# Patient Record
Sex: FEMALE | Race: BLACK OR AFRICAN AMERICAN | NOT HISPANIC OR LATINO | Employment: OTHER | URBAN - METROPOLITAN AREA
[De-identification: names, ages, dates, MRNs, and addresses within clinical notes are randomized per-mention and may not be internally consistent; named-entity substitution may affect disease eponyms.]

---

## 2023-10-17 ENCOUNTER — TELEPHONE (OUTPATIENT)
Dept: OPHTHALMOLOGY | Facility: CLINIC | Age: 73
End: 2023-10-17
Payer: MEDICARE

## 2023-10-17 ENCOUNTER — TRANSCRIBE ORDERS (OUTPATIENT)
Dept: OTHER | Age: 73
End: 2023-10-17

## 2023-10-17 ENCOUNTER — TELEPHONE (OUTPATIENT)
Dept: OPHTHALMOLOGY | Facility: CLINIC | Age: 73
End: 2023-10-17

## 2023-10-17 DIAGNOSIS — D35.2 PITUITARY ADENOMA (H): Primary | ICD-10-CM

## 2023-10-17 NOTE — TELEPHONE ENCOUNTER
Ok per protocols for neuro-optometry/Dr. Andrews in lieu of any acute vision change.    Note to patient communicator to assist in scheduling    Roby Cheng RN 10:21 AM 10/17/23

## 2023-10-17 NOTE — TELEPHONE ENCOUNTER
[facility] Call Center    Phone Message    May a detailed message be left on voicemail: yes     Reason for Call: Appointment Intake    Referring Provider Name: Referral from Myrna Peralta NP [facility]   Diagnosis and/or Symptoms: Pituitary adenoma.  GL's state to send a high priority TE.  Please review.    Action Taken: Message routed to:  [facility]    Travel Screening: Not Applicable

## 2023-10-17 NOTE — TELEPHONE ENCOUNTER
Called and spoke to Alicia     Made her an appointment for 11/6 @ 8 am with Dr. Andrews     Discussed     Wait time     Added appointment to the wait list       Declined to call her insurance - discussed billing / insurance - hospital based - pt stated we are the only clinic that has an issue with her insurance and her insurance pays for everything    I tried to explain the billing / insurance - but was not able to explain     :    Saima Price Communication Facilitator on 10/17/2023 at 11:07 AM

## 2023-10-27 ENCOUNTER — HOSPITAL ENCOUNTER (OUTPATIENT)
Dept: MRI IMAGING | Facility: CLINIC | Age: 73
Discharge: HOME | End: 2023-10-27
Admitting: NURSE PRACTITIONER
Payer: MEDICARE

## 2023-10-27 DIAGNOSIS — D35.2 PITUITARY ADENOMA (H): ICD-10-CM

## 2023-10-27 PROCEDURE — A9585 GADOBUTROL INJECTION: HCPCS

## 2023-10-27 PROCEDURE — 255N000002 HC RX 255 OP 636

## 2023-10-27 PROCEDURE — 70553 MRI BRAIN STEM W/O & W/DYE: CPT

## 2023-10-27 RX ORDER — GADOBUTROL 604.72 MG/ML
8 INJECTION INTRAVENOUS ONCE
Status: COMPLETED | OUTPATIENT
Start: 2023-10-27 | End: 2023-10-27

## 2023-10-27 RX ADMIN — GADOBUTROL 8 ML: 604.72 INJECTION INTRAVENOUS at 08:10

## 2023-10-31 ENCOUNTER — TELEPHONE (OUTPATIENT)
Dept: OPHTHALMOLOGY | Facility: CLINIC | Age: 73
End: 2023-10-31

## 2023-10-31 NOTE — TELEPHONE ENCOUNTER
[facility] Call Center    Phone Message    May a detailed message be left on voicemail: yes     Reason for Call: Appointment Intake    Referring Provider Name: Myrna Peralta NP   Diagnosis and/or Symptoms: Pituitary adenoma. PT had an appt for today 10/31/2023 @2pm but had to cancel due to weather and wasn't going to make the appt on time but is calling back to want to be seen today if still availability. Please review and contact pt to discuss. Thank you     Action Taken: Message routed to:  [facility]    Travel Screening: Not Applicable

## 2023-10-31 NOTE — TELEPHONE ENCOUNTER
Called and r/s appointment for Dec     Pt canceled her appointment twice with Dr. Andrews - scheduled for next available - added appointment to the wait list     Saima Price Communication Facilitator on 10/31/2023 at 4:24 PM

## 2023-11-07 ENCOUNTER — OFFICE VISIT (OUTPATIENT)
Dept: OPHTHALMOLOGY | Facility: CLINIC | Age: 73
End: 2023-11-07
Attending: OPHTHALMOLOGY
Payer: MEDICARE

## 2023-11-07 DIAGNOSIS — D35.2 PITUITARY ADENOMA (H): Primary | ICD-10-CM

## 2023-11-07 DIAGNOSIS — H91.92 DECREASED HEARING OF LEFT EAR: ICD-10-CM

## 2023-11-07 PROBLEM — N39.41 URGE INCONTINENCE OF URINE: Status: ACTIVE | Noted: 2023-11-07

## 2023-11-07 PROBLEM — Z85.41 HISTORY OF MALIGNANT NEOPLASM OF CERVIX: Status: ACTIVE | Noted: 2023-11-07

## 2023-11-07 PROCEDURE — 92083 EXTENDED VISUAL FIELD XM: CPT

## 2023-11-07 PROCEDURE — 92133 CPTRZD OPH DX IMG PST SGM ON: CPT

## 2023-11-07 PROCEDURE — G0463 HOSPITAL OUTPT CLINIC VISIT: HCPCS

## 2023-11-07 PROCEDURE — 99204 OFFICE O/P NEW MOD 45 MIN: CPT

## 2023-11-07 ASSESSMENT — SLIT LAMP EXAM - LIDS
COMMENTS: NORMAL
COMMENTS: NORMAL

## 2023-11-07 ASSESSMENT — REFRACTION_WEARINGRX
OS_SPHERE: +2.00
OD_SPHERE: +2.00
OD_CYLINDER: SPHERE
SPECS_TYPE: OTC READING
OS_CYLINDER: SPHERE

## 2023-11-07 ASSESSMENT — VISUAL ACUITY
OD_SC+: -3
OS_SC+: -2
OD_SC: 20/25
OS_SC: 20/25
METHOD: SNELLEN - LINEAR

## 2023-11-07 ASSESSMENT — CONF VISUAL FIELD
OD_INFERIOR_TEMPORAL_RESTRICTION: 0
METHOD: COUNTING FINGERS
OS_INFERIOR_NASAL_RESTRICTION: 0
OD_NORMAL: 1
OD_SUPERIOR_TEMPORAL_RESTRICTION: 0
OS_INFERIOR_TEMPORAL_RESTRICTION: 0
OS_NORMAL: 1
OD_SUPERIOR_NASAL_RESTRICTION: 0
OD_INFERIOR_NASAL_RESTRICTION: 0
OS_SUPERIOR_NASAL_RESTRICTION: 0
OS_SUPERIOR_TEMPORAL_RESTRICTION: 0

## 2023-11-07 ASSESSMENT — TONOMETRY
OS_IOP_MMHG: 17
OD_IOP_MMHG: 19
IOP_METHOD: ICARE

## 2023-11-07 ASSESSMENT — CUP TO DISC RATIO
OS_RATIO: 0.35
OD_RATIO: 0.35

## 2023-11-07 ASSESSMENT — EXTERNAL EXAM - RIGHT EYE: OD_EXAM: NORMAL

## 2023-11-07 ASSESSMENT — EXTERNAL EXAM - LEFT EYE: OS_EXAM: NORMAL

## 2023-11-07 NOTE — NURSING NOTE
"Chief Complaint(s) and History of Present Illness(es)       New Patient    In both eyes.  Associated symptoms include Negative for double vision, eye pain, headache, flashes and floaters.  Pain was noted as 0/10. Additional comments: Alicia Leonard is a 73 year old female sent for consultation by Myrna Peralta for pituitary adenoma.    Alicia reports 4-5 episodes of \"chevron clear-like glass\" image in her vision, would go away after she closed and rubbed her eyes.  No eye pain or headaches.  Have always had unremarkable eye exams in the past, last eye exam was 4 years ago.  Only wears reading glasses.   CHAYO Alexandre 11/7/2023 11:37 AM                   "

## 2023-11-07 NOTE — PROGRESS NOTES
"Alicia Leonard is a 73 year old female with the following diagnoses:   Pituitary adenoma     Patient was sent for consultation by Myrna Peralta NP for vision evaluation in the presence of pituitary adenoma.     HPI:     Alicia Leonard is a 73 y.o. female with a pituitary adenoma discovered in 2020.  Per patient,  she was instructed that she did not need follow up unless she developed vision changes.       Since then, she has had 4-5 episodes of  \"chevron clear-like glass\" image appear in her vision (sporadic over a period of years).  This would resolve with closing and rubbing her eyes.  These episodes only last a few seconds at a time.       She does note a history of migraines.  These have not flared since about her 20s.  She denies double vision.    Alicia has had a fullness/pressure sensation of the left ear.  In 2020, this prompted a work-up by ENT that revealed her incidental pituitary adenoma.  She reports having a work-up for reduced hearing in the left ear around the same time.    Independent historians:  Patient     Review of outside testing:  MRI Pituitary 10/27/23:  9 x 7 mm hypoenhancing lesion involves the right aspect of  the sella compatible with a pituitary microadenoma. There is slight  infundibular stalk deviation to the left. The optic nerves are  unremarkable. No mass effect upon the optic chiasm.      Review of outside clinical notes:  Visit with Dr. Landin 10/12/23:  ASSESSMENT:  1. Pituitary microadenoma     PLAN:  1. We discussed her current imaging and symptoms. After thorough review, I recommend the patient obtain an updated MRI with and without contrast.  2. We will refer the patient to ophthalmology and endocrinology.  3. A very comprehensive discussion was done with this patient in discussing non surgical intervention and the gravity of this type of intervention. A shared decision-making approach was utilized.     She will follow up after updated imaging.      Past medical " history:  Cervical cancer (resolved)    Medications:   None     Family history / social history:  Patient's family history is renal failure (mother).     Patient denies history of smoking.  No alcohol consumption.        Exam:  Uncorrected distance visual acuity was 20/25 -3 in the right eye and 20/25 -2 in the left eye. Intraocular pressure was 19 in the right eye and 17 in the left eye using ICare.  Color vision 11/11 right eye and 11/11 left eye.  Pupils isocoric with no APD.     See complete chart note for anterior segment, fundus, and strabismus exam.     Tests ordered and interpreted today:  OCT RNFL:  Right eye: Average RNFL 109 microns.  Normal compared to age-matched controls.  Left eye: Average RNFL 121 microns.  Normal compared to age-matched controls.           GTop:  Right eye: Reliable.  Mean deviation 0.6 dB.  2 points of temporal depression.  Left eye: Unreliable d/t high FP.  Mean deviation -0.3 dB.  Non-specific defect.     Assessment/Plan:   It is my impression that this patient has intact visual function in the context of pituitary adenoma discovered in 2020 without previous visual complication.  Her retinal nerve fiber layer and ganglion cell layer are intact.  Alicia's visual field testing revealed non-specific defects, although there was limited testing reliability (first time field taker).  She should follow-up with me in 6 months.  Assuming stability at that exam, we may consider monitoring annually.    I am unclear on the etiology of the brief, seconds-long chevron pattern that appear in her eyes.  These episodes have been rare and sporadic, and I reassured her of a normal ocular health exam.  She notes a history of migraines; these episodes may be atypical ocular migraines.    Alicia notes persistent fullness/pressure sensation of the left ear and reduced hearing for years (ENT work-up in [place] led to incidental discovery of pituitary adenoma).  She wishes to re-establish care with ENT  in [place].  Referral completed.     Complete documentation of historical and exam elements from today's encounter can be found in the full encounter summary report (not reduplicated in this progress note). I personally obtained the chief complaint(s) and history of present illness. I confirmed and edited as necessary the review of systems, past medical/surgical history, family history, social history, and examination findings as document by others; and I examined the patient myself. I personally reviewed the relevant tests, images, and reports as documented above. I formulated and edited as necessary the assessment and plan and discussed the findings and management plan with the patient and family.     Isabel Andrews, OD

## 2023-11-09 NOTE — TELEPHONE ENCOUNTER
FUTURE VISIT INFORMATION      FUTURE VISIT INFORMATION:  Date: 1/11/2024  Time: 8:20 AM  Location: [facility]  REFERRAL INFORMATION:  Referring provider:  Isabel Andrews OD   Referring providers clinic:  [facility]  Reason for visit/diagnosis  Per Pt, dx decrease in hearing, referral from   Isabel Andrews OD     RECORDS REQUESTED FROM:       Clinic name Comments Records Status Imaging Status   [facility] 11/7/23 referral/ OV with Isabel Andrews OD  [facility] MR BRAIN/PITUITARY 10/27/23 [facility] PACS   [facility] Imaging MR brain 8/27/20  MR brain 1/31/20 Scanned in [facility] PACS   [facility]  11/8/23- OV [facility]   [facility]

## 2023-12-31 ENCOUNTER — HEALTH MAINTENANCE LETTER (OUTPATIENT)
Age: 73
End: 2023-12-31

## 2024-01-04 DIAGNOSIS — Z01.10 ENCOUNTER FOR HEARING TEST: Primary | ICD-10-CM

## 2024-01-11 ENCOUNTER — OFFICE VISIT (OUTPATIENT)
Dept: OTOLARYNGOLOGY | Facility: CLINIC | Age: 74
End: 2024-01-11
Attending: AUDIOLOGIST
Payer: MEDICARE

## 2024-01-11 ENCOUNTER — OFFICE VISIT (OUTPATIENT)
Dept: AUDIOLOGY | Facility: CLINIC | Age: 74
End: 2024-01-11
Attending: AUDIOLOGIST
Payer: MEDICARE

## 2024-01-11 ENCOUNTER — PRE VISIT (OUTPATIENT)
Dept: OTOLARYNGOLOGY | Facility: CLINIC | Age: 74
End: 2024-01-11

## 2024-01-11 VITALS
OXYGEN SATURATION: 99 % | SYSTOLIC BLOOD PRESSURE: 149 MMHG | TEMPERATURE: 96.8 F | BODY MASS INDEX: 28.61 KG/M2 | HEIGHT: 66 IN | WEIGHT: 178 LBS | DIASTOLIC BLOOD PRESSURE: 86 MMHG | HEART RATE: 90 BPM

## 2024-01-11 DIAGNOSIS — H65.492 CHRONIC MEE (MIDDLE EAR EFFUSION), LEFT: Primary | ICD-10-CM

## 2024-01-11 DIAGNOSIS — H90.5 SENSORINEURAL HEARING LOSS OF RIGHT EAR: Primary | ICD-10-CM

## 2024-01-11 DIAGNOSIS — H90.72 MIXED HEARING LOSS OF LEFT EAR: ICD-10-CM

## 2024-01-11 PROCEDURE — 92565 STENGER TEST PURE TONE: CPT | Performed by: AUDIOLOGIST

## 2024-01-11 PROCEDURE — 92557 COMPREHENSIVE HEARING TEST: CPT | Performed by: AUDIOLOGIST

## 2024-01-11 PROCEDURE — 92550 TYMPANOMETRY & REFLEX THRESH: CPT | Mod: 52 | Performed by: AUDIOLOGIST

## 2024-01-11 PROCEDURE — 99203 OFFICE O/P NEW LOW 30 MIN: CPT | Performed by: REGISTERED NURSE

## 2024-01-11 ASSESSMENT — PAIN SCALES - GENERAL: PAINLEVEL: NO PAIN (0)

## 2024-01-11 NOTE — NURSING NOTE
"Chief Complaint   Patient presents with    Consult     Hearing loss     Blood pressure (!) 149/86, pulse 90, temperature 96.8  F (36  C), height 1.676 m (5' 6\"), weight 80.7 kg (178 lb), SpO2 99%.  Jaciel Collins LPN    "

## 2024-01-11 NOTE — PROGRESS NOTES
AUDIOLOGY REPORT    SUMMARY: Audiology visit completed. See audiogram for results.      RECOMMENDATIONS: Follow-up with ENT.    Jos Meléndez, [facility]  Licensed Audiologist  [place] License #[id number]

## 2024-01-11 NOTE — LETTER
"1/11/2024       RE: Alicia Leonard  [address] [place]  [facility] [address]     Dear Colleague,    Thank you for referring your patient, Alicia Leonard, to the [facility] [place] at [facility]. Please see a copy of my visit note below.      Otolaryngology Clinic  January 11, 2024    Chief Complaint:   Left hearing loss and ear pressure       History of Present Illness:   Alicia Leonard is a 73 year old female who presents today for evaluation of left ear symptoms. Patient reports a history of chronic ear pressure and hearing loss of the left ear. This has been present for many years. Symptoms are stable. Denies any otalgia, otorrhea, dizziness, history of ear infections or ear surgeries. Patient does have a history of a pituitary adenoma this has been followed by an outside neurosurgery clinic as patient has recently relocated to [place].    Past Medical History:  Past Medical History:   Diagnosis Date    Cancer (H) 2017       Past Surgical History:  No past surgical history on file.    Medications:  No current outpatient medications on file.       Allergies:  No Known Allergies     Social History:  Social History     Tobacco Use    Smoking status: Never    Smokeless tobacco: Never   Substance Use Topics    Alcohol use: Never    Drug use: Never       ROS: 10 point ROS neg other than the symptoms noted above in the HPI.    Physical Exam:    BP (!) 149/86   Pulse 90   Temp 96.8  F (36  C)   Ht 1.676 m (5' 6\")   Wt 80.7 kg (178 lb)   SpO2 99%   BMI 28.73 kg/m       Constitutional:  The patient was unaccompanied, well-groomed, and in no acute distress.     Skin: Normal:  warm and pink without rash    Neurologic: Alert and oriented x 3.  CN's III-XII within normal limits.  Voice normal.    Psychiatric: The patient's affect was calm, cooperative, and appropriate.     Communication:  Normal; communicates verbally, normal " voice quality.    Respiratory: Breathing comfortably without stridor or exertion of accessory muscles.    Ears: Pinnae and tragus non-tender.        Otologic microscope exam:    Right ear was examined under the microscope.  Normal appearing TM, nicely aerated middle ear space.     Left ear was also examined under the microscope.  Narrow canal. Ear canal is clean and dry. TM is intact. Clear effusion in middle ear space.     Audiogram: 1/11/2024 - data independently reviewed  Right ear: Normal steeply sloping to mild high frequency sensorineural hearing loss   Left ear: Normal sloping to moderately severe mixed hearing loss   WR right: 100% at 50 dB left: 100% at 60 dB  Tympanograms: type A right, type B left      Procedure: Left myringotomy performed by Dr. Cisneros  After obtaining verbal consent, patient positioned supine under microscopy. TM anesthestized using phenol. Myringotomy performed. Middle ear effusion collected via tymp trap suction. Patient tolerated procedure.      Assessment and Plan:  1. Chronic CLEMENTE (middle ear effusion), left  Patient with left chronic middle ear effusion. Myringotomy performed in clinic today by Dr. Cisneros. Patient tolerated procedure well without difficulty. Attempted to collect middle ear drainage to test for beta-2 but there was not enough drainage to send for evaluation. Patient was given collection tube in case she experiences drainage from the left ear.     Recommend that patient follow up in 1-2 months for recheck of ear to ensure TM healing and resolution of effusion. Reviewed dry ear precautions with patient. Patient should contact clinic for any purulent ear drainage or ear pain.     Ivania Sanchez DNP, APRN, CNP  Otolaryngology  Head & Neck Surgery  [phone]    30 minutes spent by me on the date of the encounter doing chart review, history and exam, documentation and further activities per the note excluding time spent performing myringotomy.

## 2024-01-15 NOTE — PROGRESS NOTES
"  Otolaryngology Clinic  January 11, 2024    Chief Complaint:   Left hearing loss and ear pressure       History of Present Illness:   Alicia Leonard is a 73 year old female who presents today for evaluation of left ear symptoms. Patient reports a history of chronic ear pressure and hearing loss of the left ear. This has been present for many years. Symptoms are stable. Denies any otalgia, otorrhea, dizziness, history of ear infections or ear surgeries. Patient does have a history of a pituitary adenoma this has been followed by an outside neurosurgery clinic as patient has recently relocated to [place].    Past Medical History:  Past Medical History:   Diagnosis Date    Cancer (H) 2017       Past Surgical History:  No past surgical history on file.    Medications:  No current outpatient medications on file.       Allergies:  No Known Allergies     Social History:  Social History     Tobacco Use    Smoking status: Never    Smokeless tobacco: Never   Substance Use Topics    Alcohol use: Never    Drug use: Never       ROS: 10 point ROS neg other than the symptoms noted above in the HPI.    Physical Exam:    BP (!) 149/86   Pulse 90   Temp 96.8  F (36  C)   Ht 1.676 m (5' 6\")   Wt 80.7 kg (178 lb)   SpO2 99%   BMI 28.73 kg/m       Constitutional:  The patient was unaccompanied, well-groomed, and in no acute distress.     Skin: Normal:  warm and pink without rash    Neurologic: Alert and oriented x 3.  CN's III-XII within normal limits.  Voice normal.    Psychiatric: The patient's affect was calm, cooperative, and appropriate.     Communication:  Normal; communicates verbally, normal voice quality.    Respiratory: Breathing comfortably without stridor or exertion of accessory muscles.    Ears: Pinnae and tragus non-tender.        Otologic microscope exam:    Right ear was examined under the microscope.  Normal appearing TM, nicely aerated middle ear space.     Left ear was also examined under the microscope.  " Narrow canal. Ear canal is clean and dry. TM is intact. Clear effusion in middle ear space.     Audiogram: 1/11/2024 - data independently reviewed  Right ear: Normal steeply sloping to mild high frequency sensorineural hearing loss   Left ear: Normal sloping to moderately severe mixed hearing loss   WR right: 100% at 50 dB left: 100% at 60 dB  Tympanograms: type A right, type B left      Procedure: Left myringotomy performed by Dr. Cisneros  After obtaining verbal consent, patient positioned supine under microscopy. TM anesthestized using phenol. Myringotomy performed. Middle ear effusion collected via tymp trap suction. Patient tolerated procedure.      Assessment and Plan:  1. Chronic CLEMENTE (middle ear effusion), left  Patient with left chronic middle ear effusion. Myringotomy performed in clinic today by Dr. Cisneros. Patient tolerated procedure well without difficulty. Attempted to collect middle ear drainage to test for beta-2 but there was not enough drainage to send for evaluation. Patient was given collection tube in case she experiences drainage from the left ear.     Recommend that patient follow up in 1-2 months for recheck of ear to ensure TM healing and resolution of effusion. Reviewed dry ear precautions with patient. Patient should contact clinic for any purulent ear drainage or ear pain.     Ivania Sanchez DNP, APRN, CNP  Otolaryngology  Head & Neck Surgery  [phone]    30 minutes spent by me on the date of the encounter doing chart review, history and exam, documentation and further activities per the note excluding time spent performing myringotomy.

## 2024-04-29 NOTE — PROGRESS NOTES
HPI:    Patient was last seen on 11/7/23 by me for vision evaluation in the presence of pituitary adenoma.  At that time, there was no evidence of compressive optic neuropathy.  Her visual fields were unreliable.  She also noted persistent fullness/pressure sensation of the left ear and reduced hearing for years (ENT work-up in [place] led to incidental discovery of pituitary adenoma).  I referred her to ENT to establish care.    Since then, she denies any vision changes.  No new headaches, double vision, or eye pain.  She reports she started verapamil.    Additionally, she did establish care with ENT and was found to have chronic middle ear effusion of the left ear.    Review of outside note:  Visit with endocrinology 1/26/24:  Assessment:  1. Pituitary microadenoma: 0.9 x 0.7 sellar mass, initially discovered in 2020. Will complete biochemical workup.    Plan:  1. Labs: ACTH, cortisol, LH, FSH, IGF-1, prolactin, TSH, FT4, and LNSC x 2  2. Follow-up in 1 year (unless results suggest otherwise)     Today's Testing:  OCT RNFL:  Right eye: Average RNFL 110 microns.  Stable, normal compared to age-matched controls.  Left eye: Average RNFL 120 microns.  Stable, normal compared to age-matched controls.     GTop: Per patient, misunderstood instructions and did not click.  Right eye: Unreliable, non-specific defect.  Left eye: Unreliable, circumferential constriction.    Assessment and Plan:  It is my impression that Alicia has a stable exam without evidence of compressive optic neuropathy in the setting of pituitary adenoma discovered in 2020 without previous visual complication.  Her retinal nerve fiber layer and ganglion cell layer are intact.  Alicia's visual field today was unreliable.  She should follow-up with me in 1 year or sooner with any concerns/changes.    Complete documentation of historical and exam elements from today's encounter can be found in the full encounter summary report (not reduplicated in this  progress note). I personally obtained the chief complaint(s) and history of present illness. I confirmed and edited as necessary the review of systems, past medical/surgical history, family history, social history, and examination findings as document by others; and I examined the patient myself. I personally reviewed the relevant tests, images, and reports as documented above. I formulated and edited as necessary the assessment and plan and discussed the findings and management plan with the patient and family.    Isabel Andrews, OD

## 2024-05-06 ENCOUNTER — OFFICE VISIT (OUTPATIENT)
Dept: OPHTHALMOLOGY | Facility: CLINIC | Age: 74
End: 2024-05-06
Payer: MEDICARE

## 2024-05-06 DIAGNOSIS — D35.2 PITUITARY ADENOMA (H): Primary | ICD-10-CM

## 2024-05-06 PROCEDURE — 92133 CPTRZD OPH DX IMG PST SGM ON: CPT

## 2024-05-06 PROCEDURE — 92083 EXTENDED VISUAL FIELD XM: CPT

## 2024-05-06 PROCEDURE — 99213 OFFICE O/P EST LOW 20 MIN: CPT

## 2024-05-06 PROCEDURE — G0463 HOSPITAL OUTPT CLINIC VISIT: HCPCS

## 2024-05-06 RX ORDER — VERAPAMIL HYDROCHLORIDE 120 MG/1
120 CAPSULE, EXTENDED RELEASE ORAL
COMMUNITY
Start: 2024-04-03 | End: 2024-06-19

## 2024-05-06 ASSESSMENT — CONF VISUAL FIELD
OD_INFERIOR_NASAL_RESTRICTION: 0
OD_INFERIOR_TEMPORAL_RESTRICTION: 0
OS_INFERIOR_TEMPORAL_RESTRICTION: 0
OD_SUPERIOR_TEMPORAL_RESTRICTION: 0
OS_NORMAL: 1
OD_SUPERIOR_NASAL_RESTRICTION: 0
OS_SUPERIOR_NASAL_RESTRICTION: 0
OS_SUPERIOR_TEMPORAL_RESTRICTION: 0
OD_NORMAL: 1
METHOD: COUNTING FINGERS
OS_INFERIOR_NASAL_RESTRICTION: 0

## 2024-05-06 ASSESSMENT — EXTERNAL EXAM - RIGHT EYE: OD_EXAM: NORMAL

## 2024-05-06 ASSESSMENT — EXTERNAL EXAM - LEFT EYE: OS_EXAM: NORMAL

## 2024-05-06 ASSESSMENT — SLIT LAMP EXAM - LIDS
COMMENTS: NORMAL
COMMENTS: NORMAL

## 2024-05-06 ASSESSMENT — TONOMETRY
OS_IOP_MMHG: 15
OD_IOP_MMHG: 16
IOP_METHOD: ICARE

## 2024-05-06 ASSESSMENT — CUP TO DISC RATIO
OS_RATIO: 0.35
OD_RATIO: 0.35

## 2024-05-06 ASSESSMENT — VISUAL ACUITY
OD_SC: 20/25
METHOD: SNELLEN - LINEAR
OD_SC+: -2
OS_SC: 20/30

## 2024-05-28 ENCOUNTER — OFFICE VISIT (OUTPATIENT)
Dept: INTERNAL MEDICINE | Facility: CLINIC | Age: 74
End: 2024-05-28
Payer: MEDICARE

## 2024-05-28 VITALS
OXYGEN SATURATION: 99 % | HEART RATE: 89 BPM | BODY MASS INDEX: 28.64 KG/M2 | TEMPERATURE: 97.1 F | DIASTOLIC BLOOD PRESSURE: 82 MMHG | WEIGHT: 178.2 LBS | HEIGHT: 66 IN | RESPIRATION RATE: 16 BRPM | SYSTOLIC BLOOD PRESSURE: 132 MMHG

## 2024-05-28 DIAGNOSIS — R07.89 RIGHT-SIDED CHEST WALL PAIN: Primary | ICD-10-CM

## 2024-05-28 DIAGNOSIS — I10 ESSENTIAL HYPERTENSION: ICD-10-CM

## 2024-05-28 PROCEDURE — 99203 OFFICE O/P NEW LOW 30 MIN: CPT | Performed by: INTERNAL MEDICINE

## 2024-05-28 ASSESSMENT — PAIN SCALES - GENERAL: PAINLEVEL: NO PAIN (0)

## 2024-05-28 NOTE — PROGRESS NOTES
"  Assessment & Plan     Right-sided chest wall pain  Reproducible pain at the upper breast area and in the right back area.  Patient does endorse doing a lot of lifting and moving since patient has been moving boxes into storage.  Overall, patient has slowly improved over time.  Breast examination unremarkable.  Discussed with the patient on differentials including most likely musculoskeletal strain with lifting.  Discussed with the patient on completion of mammogram to rule out any underlying breast concerns since patient's last mammogram was in 2018.  Patient would like to hold off on that for now.  Encouraged to continue with gentle stretching exercises to help with the pain, pain is slowly improving over time.  Can use Biofreeze or lidocaine patch at the area of reproducible tenderness at the back.  Will follow-up sooner if symptoms do not improve.    Essential hypertension  Within target.  On verapamil medication.  Tolerating medication well.          BMI  Estimated body mass index is 28.76 kg/m  as calculated from the following:    Height as of this encounter: 1.676 m (5' 6\").    Weight as of this encounter: 80.8 kg (178 lb 3.2 oz).             Johnson Vargas is a 74 year old, presenting for the following health issues:  Breast Pain (Right side breast pain, started three days ago )    History of Present Illness       Reason for visit:  Sensitive touch places on my right breast.  Pain on side of right and back of breast.  Symptom onset:  1-3 days ago  Symptoms include:  Pain behind my right breast.  When inhale pain in right breast.  Symptom intensity:  Moderate  Symptom progression:  Staying the same  Had these symptoms before:  No  What makes it worse:  Bending forward  What makes it better:  The discomfort subsides on its own.    She eats 2-3 servings of fruits and vegetables daily.She consumes 1 sweetened beverage(s) daily.She exercises with enough effort to increase her heart rate 30 to 60 minutes per " "day.  She exercises with enough effort to increase her heart rate 7 days per week.   She is taking medications regularly.                 Review of Systems  Constitutional, HEENT, cardiovascular, pulmonary, gi and gu systems are negative, except as otherwise noted.      Objective    /82 (BP Location: Right arm, Patient Position: Sitting, Cuff Size: Adult Regular)   Pulse 89   Temp 97.1  F (36.2  C) (Tympanic)   Resp 16   Ht 1.676 m (5' 6\")   Wt 80.8 kg (178 lb 3.2 oz)   SpO2 99%   BMI 28.76 kg/m    Body mass index is 28.76 kg/m .  Physical Exam   GENERAL: alert and no distress  BREAST: normal without masses, tenderness or nipple discharge and no palpable axillary masses or adenopathy  MS: no gross musculoskeletal defects noted, no edema  NEURO: Normal strength and tone, mentation intact and speech normal  PSYCH: mentation appears normal, affect normal            Signed Electronically by: Mariann Blakely MD    "

## 2024-06-04 ENCOUNTER — MYC MEDICAL ADVICE (OUTPATIENT)
Dept: OTOLARYNGOLOGY | Facility: CLINIC | Age: 74
End: 2024-06-04
Payer: MEDICARE

## 2024-06-06 DIAGNOSIS — Z01.10 ENCOUNTER FOR HEARING TEST: Primary | ICD-10-CM

## 2024-06-11 ENCOUNTER — OFFICE VISIT (OUTPATIENT)
Dept: AUDIOLOGY | Facility: CLINIC | Age: 74
End: 2024-06-11
Payer: MEDICARE

## 2024-06-11 DIAGNOSIS — H90.A32 MIXED CONDUCTIVE AND SENSORINEURAL HEARING LOSS OF LEFT EAR WITH RESTRICTED HEARING OF RIGHT EAR: ICD-10-CM

## 2024-06-11 DIAGNOSIS — H90.A21 SENSORINEURAL HEARING LOSS (SNHL) OF RIGHT EAR WITH RESTRICTED HEARING OF LEFT EAR: Primary | ICD-10-CM

## 2024-06-11 PROCEDURE — 92550 TYMPANOMETRY & REFLEX THRESH: CPT | Mod: 52 | Performed by: AUDIOLOGIST

## 2024-06-11 PROCEDURE — 92557 COMPREHENSIVE HEARING TEST: CPT | Performed by: AUDIOLOGIST

## 2024-06-11 NOTE — PROGRESS NOTES
AUDIOLOGY REPORT    SUMMARY: Audiology visit completed. See audiogram for results.      RECOMMENDATIONS: Follow-up with ENT.    Preethi Reynolds, [facility]  Licensed Audiologist  [place] #[id number]

## 2024-06-19 ENCOUNTER — OFFICE VISIT (OUTPATIENT)
Dept: OTOLARYNGOLOGY | Facility: CLINIC | Age: 74
End: 2024-06-19
Payer: MEDICARE

## 2024-06-19 VITALS
HEART RATE: 83 BPM | OXYGEN SATURATION: 99 % | HEIGHT: 66 IN | BODY MASS INDEX: 28.88 KG/M2 | WEIGHT: 179.7 LBS | SYSTOLIC BLOOD PRESSURE: 160 MMHG | DIASTOLIC BLOOD PRESSURE: 77 MMHG

## 2024-06-19 DIAGNOSIS — H92.12 OTORRHEA, LEFT: Primary | ICD-10-CM

## 2024-06-19 DIAGNOSIS — G96.01 CSF LEAK FROM EAR: ICD-10-CM

## 2024-06-19 DIAGNOSIS — H65.492 CHRONIC MEE (MIDDLE EAR EFFUSION), LEFT: ICD-10-CM

## 2024-06-19 DIAGNOSIS — H65.492 CHRONIC MEE (MIDDLE EAR EFFUSION), LEFT: Primary | ICD-10-CM

## 2024-06-19 LAB
B2 TRANSFERRIN FLD QL: POSITIVE
B2 TRANSFERRIN INTERPRETATION: ABNORMAL
PATH REPORT.COMMENTS IMP SPEC: ABNORMAL

## 2024-06-19 PROCEDURE — 86334 IMMUNOFIX E-PHORESIS SERUM: CPT | Performed by: PATHOLOGY

## 2024-06-19 PROCEDURE — 99213 OFFICE O/P EST LOW 20 MIN: CPT | Mod: 25 | Performed by: REGISTERED NURSE

## 2024-06-19 PROCEDURE — 69420 INCISION OF EARDRUM: CPT | Mod: LT | Performed by: REGISTERED NURSE

## 2024-06-19 PROCEDURE — 86334 IMMUNOFIX E-PHORESIS SERUM: CPT | Mod: 26 | Performed by: PATHOLOGY

## 2024-06-19 ASSESSMENT — PAIN SCALES - GENERAL: PAINLEVEL: NO PAIN (0)

## 2024-06-19 NOTE — LETTER
6/19/2024       RE: Alicia Leonard  [address] [place] [facility] [address]     Dear Colleague,    Thank you for referring your patient, Alicia Leonard, to the [facility] E[facility]SE AND [facility] [place] [facility]. Please see a copy of my visit note below.      Otolaryngology Clinic  June 19, 2024    HPI:  Alicia Leonard is here for a recheck of their left ear. Last seen in clinic 1/11/24 for left ear fullness and found to have a chronic left middle ear effusion. Myringotomy performed but unable to collect enough fluid to send for beta 2. Recommended follow up as needed for any new ear fullness.    Today, patient reports that ear slowing became plugged over February. Reports difficulty hearing and tinnitus. Denies any headache, blurred vision.      Otologic microscope exam:    Patient's ear pathology required use of the binocular microscope for the purpose of cleaning and improving visualization in order to assure a more accurate diagnostic evaluation.    Left ear was also examined under the microscope. Small amount of cerumen in anterior canal. It was cleaned with suction and alligator forceps. Once cleaned, TM visualized under microscope. TM intact with dull middle ear space consistent with effusion.     Audiogram: 6/11/2024 - data independently reviewed  Right ear: Normal sloping to mild high frequency sensorineural hearing loss   Left ear: Moderate sloping to severe mixed hearing loss   WR right: 100% at 50 dB left: 100% at 80 dB   Tympanograms: type A right, type B left     Procedure: Left Myringotomy   Patient placed supine. The left ear was examined with a speculum and microscope. Anterior inferior placement of phenol was accomplished. Dr. Cisneros performed myringotomy using a with myringotomy blade. Clear middle ear fluid suctioned using tymp trap and sent to lab for Beta 2 transferrin. Patient tolerated procedure. Improvement of hearing  after myringotomy.    Assessment and Plan:  The patient presents for myringotomy and fluid collection of left middle ear effusion. Middle ear fluid sent for beta 2 transferrin. Will update patient with results once available. If negative, patient could consider a PE tube placement for recurrent symptoms.       Patient will follow up based on results of middle ear fluid.      20 minutes spent on the date of the encounter doing chart review, history and exam, documentation and further activities per the note excluding time spent performing myringotomy.        Again, thank you for allowing me to participate in the care of your patient.      Sincerely,    Sonia Sanchez NP

## 2024-06-19 NOTE — PROGRESS NOTES
Otolaryngology Clinic  June 19, 2024    HPI:  Alicia Leonard is here for a recheck of their left ear. Last seen in clinic 1/11/24 for left ear fullness and found to have a chronic left middle ear effusion. Myringotomy performed but unable to collect enough fluid to send for beta 2. Recommended follow up as needed for any new ear fullness.    Today, patient reports that ear slowing became plugged over February. Reports difficulty hearing and tinnitus. Denies any headache, blurred vision.      Otologic microscope exam:    Patient's ear pathology required use of the binocular microscope for the purpose of cleaning and improving visualization in order to assure a more accurate diagnostic evaluation.    Left ear was also examined under the microscope. Small amount of cerumen in anterior canal. It was cleaned with suction and alligator forceps. Once cleaned, TM visualized under microscope. TM intact with dull middle ear space consistent with effusion.     Audiogram: 6/11/2024 - data independently reviewed  Right ear: Normal sloping to mild high frequency sensorineural hearing loss   Left ear: Moderate sloping to severe mixed hearing loss   WR right: 100% at 50 dB left: 100% at 80 dB   Tympanograms: type A right, type B left     Procedure: Left Myringotomy   Patient placed supine. The left ear was examined with a speculum and microscope. Anterior inferior placement of phenol was accomplished. Dr. Cisneros performed myringotomy using a with myringotomy blade. Clear middle ear fluid suctioned using tymp trap and sent to lab for Beta 2 transferrin. Patient tolerated procedure. Improvement of hearing after myringotomy.    Assessment and Plan:  The patient presents for myringotomy and fluid collection of left middle ear effusion. Middle ear fluid sent for beta 2 transferrin. Will update patient with results once available. If negative, patient could consider a PE tube placement for recurrent symptoms.       Patient will  follow up based on results of middle ear fluid.    Ivania Sanchez DNP, APRN, CNP  Otolaryngology  Head & Neck Surgery  [phone]    20 minutes spent on the date of the encounter doing chart review, history and exam, documentation and further activities per the note excluding time spent performing myringotomy.

## 2024-06-19 NOTE — NURSING NOTE
"Chief Complaint   Patient presents with    RECHECK   Blood pressure (!) 160/77, pulse 83, height 1.676 m (5' 6\"), weight 81.5 kg (179 lb 11.2 oz), SpO2 99%. Merlin Coyle, EMT    "

## 2024-06-19 NOTE — PROGRESS NOTES
Left message with patient to call back to discuss lab results. Call back number left on message.    Ivania Sanchez DNP, APRN, CNP.  6/19/2024 3:47 PM

## 2024-06-24 ENCOUNTER — TELEPHONE (OUTPATIENT)
Dept: OTOLARYNGOLOGY | Facility: CLINIC | Age: 74
End: 2024-06-24
Payer: MEDICARE

## 2024-06-24 NOTE — TELEPHONE ENCOUNTER
Spoke with patient regarding Beta 2 transferrin results from last week. Message left last week but patient was unable to return call. Beta-2 is positive. Recommend MRI, CT imaging and appointment with our Skull [facility] team to discuss repair of this CSF leak.     Patient would like to discuss results with their PCP and will call back to schedule recommended follow up. Confirmed with patient that they have the number to clinic to call to schedule.    Ivania Sanchez DNP, APRN, CNP.  6/24/2024 3:32 PM

## 2024-11-13 ENCOUNTER — APPOINTMENT (OUTPATIENT)
Dept: CT IMAGING | Facility: CLINIC | Age: 74
DRG: 065 | End: 2024-11-13
Attending: EMERGENCY MEDICINE
Payer: MEDICARE

## 2024-11-13 ENCOUNTER — HOSPITAL ENCOUNTER (INPATIENT)
Facility: CLINIC | Age: 74
LOS: 3 days | Discharge: HOME | End: 2024-11-16
Attending: EMERGENCY MEDICINE | Admitting: INTERNAL MEDICINE
Payer: MEDICARE

## 2024-11-13 DIAGNOSIS — R29.898 LUE WEAKNESS: ICD-10-CM

## 2024-11-13 DIAGNOSIS — I10 BENIGN ESSENTIAL HYPERTENSION: ICD-10-CM

## 2024-11-13 DIAGNOSIS — I63.411 CEREBROVASCULAR ACCIDENT (CVA) DUE TO EMBOLISM OF RIGHT MIDDLE CEREBRAL ARTERY (H): Primary | ICD-10-CM

## 2024-11-13 LAB
ANION GAP SERPL CALCULATED.3IONS-SCNC: 10 MMOL/L (ref 7–15)
APTT PPP: 29 SECONDS (ref 22–38)
BASOPHILS # BLD AUTO: 0 10E3/UL (ref 0–0.2)
BASOPHILS NFR BLD AUTO: 1 %
BUN SERPL-MCNC: 13 MG/DL (ref 8–23)
CALCIUM SERPL-MCNC: 8.5 MG/DL (ref 8.8–10.4)
CHLORIDE SERPL-SCNC: 104 MMOL/L (ref 98–107)
CREAT SERPL-MCNC: 0.9 MG/DL (ref 0.51–0.95)
EGFRCR SERPLBLD CKD-EPI 2021: 67 ML/MIN/1.73M2
EOSINOPHIL # BLD AUTO: 0.1 10E3/UL (ref 0–0.7)
EOSINOPHIL NFR BLD AUTO: 2 %
ERYTHROCYTE [DISTWIDTH] IN BLOOD BY AUTOMATED COUNT: 14.2 % (ref 10–15)
GLUCOSE SERPL-MCNC: 95 MG/DL (ref 70–99)
HCO3 SERPL-SCNC: 23 MMOL/L (ref 22–29)
HCT VFR BLD AUTO: 31.2 % (ref 35–47)
HGB BLD-MCNC: 10 G/DL (ref 11.7–15.7)
HOLD SPECIMEN: NORMAL
IMM GRANULOCYTES # BLD: 0 10E3/UL
IMM GRANULOCYTES NFR BLD: 0 %
INR PPP: 1.1 (ref 0.85–1.15)
LYMPHOCYTES # BLD AUTO: 1.4 10E3/UL (ref 0.8–5.3)
LYMPHOCYTES NFR BLD AUTO: 26 %
MCH RBC QN AUTO: 29.9 PG (ref 26.5–33)
MCHC RBC AUTO-ENTMCNC: 32.1 G/DL (ref 31.5–36.5)
MCV RBC AUTO: 93 FL (ref 78–100)
MONOCYTES # BLD AUTO: 0.6 10E3/UL (ref 0–1.3)
MONOCYTES NFR BLD AUTO: 11 %
NEUTROPHILS # BLD AUTO: 3.2 10E3/UL (ref 1.6–8.3)
NEUTROPHILS NFR BLD AUTO: 60 %
NRBC # BLD AUTO: 0 10E3/UL
NRBC BLD AUTO-RTO: 0 /100
PLAT MORPH BLD: NORMAL
PLATELET # BLD AUTO: 211 10E3/UL (ref 150–450)
POTASSIUM SERPL-SCNC: 3.4 MMOL/L (ref 3.4–5.3)
RBC # BLD AUTO: 3.35 10E6/UL (ref 3.8–5.2)
RBC MORPH BLD: NORMAL
SODIUM SERPL-SCNC: 137 MMOL/L (ref 135–145)
TROPONIN T SERPL HS-MCNC: 18 NG/L
WBC # BLD AUTO: 5.4 10E3/UL (ref 4–11)

## 2024-11-13 PROCEDURE — 250N000011 HC RX IP 250 OP 636: Performed by: EMERGENCY MEDICINE

## 2024-11-13 PROCEDURE — 85025 COMPLETE CBC W/AUTO DIFF WBC: CPT | Performed by: EMERGENCY MEDICINE

## 2024-11-13 PROCEDURE — 84484 ASSAY OF TROPONIN QUANT: CPT | Performed by: EMERGENCY MEDICINE

## 2024-11-13 PROCEDURE — 93005 ELECTROCARDIOGRAM TRACING: CPT

## 2024-11-13 PROCEDURE — 83036 HEMOGLOBIN GLYCOSYLATED A1C: CPT | Performed by: INTERNAL MEDICINE

## 2024-11-13 PROCEDURE — 250N000009 HC RX 250: Performed by: EMERGENCY MEDICINE

## 2024-11-13 PROCEDURE — 85610 PROTHROMBIN TIME: CPT | Performed by: EMERGENCY MEDICINE

## 2024-11-13 PROCEDURE — 82310 ASSAY OF CALCIUM: CPT | Performed by: EMERGENCY MEDICINE

## 2024-11-13 PROCEDURE — 85730 THROMBOPLASTIN TIME PARTIAL: CPT | Performed by: EMERGENCY MEDICINE

## 2024-11-13 PROCEDURE — 99291 CRITICAL CARE FIRST HOUR: CPT | Mod: 25

## 2024-11-13 PROCEDURE — 70450 CT HEAD/BRAIN W/O DYE: CPT | Mod: MA

## 2024-11-13 PROCEDURE — 70496 CT ANGIOGRAPHY HEAD: CPT | Mod: MA

## 2024-11-13 PROCEDURE — 99221 1ST HOSP IP/OBS SF/LOW 40: CPT | Performed by: STUDENT IN AN ORGANIZED HEALTH CARE EDUCATION/TRAINING PROGRAM

## 2024-11-13 PROCEDURE — 250N000013 HC RX MED GY IP 250 OP 250 PS 637: Performed by: EMERGENCY MEDICINE

## 2024-11-13 PROCEDURE — 120N000001 HC R&B MED SURG/OB

## 2024-11-13 PROCEDURE — 80061 LIPID PANEL: CPT | Performed by: INTERNAL MEDICINE

## 2024-11-13 PROCEDURE — 99223 1ST HOSP IP/OBS HIGH 75: CPT | Mod: AI | Performed by: INTERNAL MEDICINE

## 2024-11-13 PROCEDURE — 36415 COLL VENOUS BLD VENIPUNCTURE: CPT | Performed by: EMERGENCY MEDICINE

## 2024-11-13 PROCEDURE — 80048 BASIC METABOLIC PNL TOTAL CA: CPT | Performed by: EMERGENCY MEDICINE

## 2024-11-13 RX ORDER — IOPAMIDOL 755 MG/ML
67 INJECTION, SOLUTION INTRAVASCULAR ONCE
Status: COMPLETED | OUTPATIENT
Start: 2024-11-13 | End: 2024-11-13

## 2024-11-13 RX ORDER — CLOPIDOGREL 300 MG/1
300 TABLET, FILM COATED ORAL ONCE
Status: COMPLETED | OUTPATIENT
Start: 2024-11-13 | End: 2024-11-13

## 2024-11-13 RX ORDER — ASPIRIN 81 MG/1
81 TABLET, CHEWABLE ORAL ONCE
Status: COMPLETED | OUTPATIENT
Start: 2024-11-13 | End: 2024-11-13

## 2024-11-13 RX ORDER — CLOTRIMAZOLE 1 %
CREAM (GRAM) TOPICAL 2 TIMES DAILY
Status: ON HOLD | COMMUNITY
End: 2024-11-14

## 2024-11-13 RX ORDER — VERAPAMIL HYDROCHLORIDE 120 MG/1
120 CAPSULE, EXTENDED RELEASE ORAL DAILY
Status: ON HOLD | COMMUNITY
End: 2024-11-16

## 2024-11-13 RX ADMIN — ASPIRIN 81 MG CHEWABLE TABLET 81 MG: 81 TABLET CHEWABLE at 22:24

## 2024-11-13 RX ADMIN — SODIUM CHLORIDE 100 ML: 9 INJECTION, SOLUTION INTRAVENOUS at 21:25

## 2024-11-13 RX ADMIN — IOPAMIDOL 67 ML: 755 INJECTION, SOLUTION INTRAVENOUS at 21:25

## 2024-11-13 RX ADMIN — CLOPIDOGREL BISULFATE 300 MG: 300 TABLET, FILM COATED ORAL at 22:24

## 2024-11-13 ASSESSMENT — ACTIVITIES OF DAILY LIVING (ADL)
ADLS_ACUITY_SCORE: 0
ADLS_ACUITY_SCORE: 0

## 2024-11-14 ENCOUNTER — APPOINTMENT (OUTPATIENT)
Dept: CT IMAGING | Facility: CLINIC | Age: 74
DRG: 065 | End: 2024-11-14
Attending: PHYSICIAN ASSISTANT
Payer: MEDICARE

## 2024-11-14 ENCOUNTER — APPOINTMENT (OUTPATIENT)
Dept: OCCUPATIONAL THERAPY | Facility: CLINIC | Age: 74
DRG: 065 | End: 2024-11-14
Attending: INTERNAL MEDICINE
Payer: MEDICARE

## 2024-11-14 ENCOUNTER — APPOINTMENT (OUTPATIENT)
Dept: CARDIOLOGY | Facility: CLINIC | Age: 74
DRG: 065 | End: 2024-11-14
Attending: PHYSICIAN ASSISTANT
Payer: MEDICARE

## 2024-11-14 ENCOUNTER — APPOINTMENT (OUTPATIENT)
Dept: MRI IMAGING | Facility: CLINIC | Age: 74
DRG: 065 | End: 2024-11-14
Attending: PHYSICIAN ASSISTANT
Payer: MEDICARE

## 2024-11-14 LAB
ATRIAL RATE - MUSE: 85 BPM
CHOLEST SERPL-MCNC: 136 MG/DL
DIASTOLIC BLOOD PRESSURE - MUSE: NORMAL MMHG
ERYTHROCYTE [DISTWIDTH] IN BLOOD BY AUTOMATED COUNT: 14.2 % (ref 10–15)
EST. AVERAGE GLUCOSE BLD GHB EST-MCNC: 120 MG/DL
GLUCOSE BLDC GLUCOMTR-MCNC: 116 MG/DL (ref 70–99)
GLUCOSE BLDC GLUCOMTR-MCNC: 78 MG/DL (ref 70–99)
GLUCOSE BLDC GLUCOMTR-MCNC: 90 MG/DL (ref 70–99)
HBA1C MFR BLD: 5.8 %
HCT VFR BLD AUTO: 33.1 % (ref 35–47)
HDLC SERPL-MCNC: 46 MG/DL
HGB BLD-MCNC: 10.9 G/DL (ref 11.7–15.7)
INTERPRETATION ECG - MUSE: NORMAL
LDLC SERPL CALC-MCNC: 67 MG/DL
LVEF ECHO: NORMAL
MCH RBC QN AUTO: 29.9 PG (ref 26.5–33)
MCHC RBC AUTO-ENTMCNC: 32.9 G/DL (ref 31.5–36.5)
MCV RBC AUTO: 91 FL (ref 78–100)
NONHDLC SERPL-MCNC: 90 MG/DL
P AXIS - MUSE: 73 DEGREES
PLATELET # BLD AUTO: 226 10E3/UL (ref 150–450)
PR INTERVAL - MUSE: 154 MS
QRS DURATION - MUSE: 82 MS
QT - MUSE: 374 MS
QTC - MUSE: 445 MS
R AXIS - MUSE: 80 DEGREES
RBC # BLD AUTO: 3.64 10E6/UL (ref 3.8–5.2)
SYSTOLIC BLOOD PRESSURE - MUSE: NORMAL MMHG
T AXIS - MUSE: 134 DEGREES
TRIGL SERPL-MCNC: 113 MG/DL
TROPONIN T SERPL HS-MCNC: 23 NG/L
VENTRICULAR RATE- MUSE: 85 BPM
WBC # BLD AUTO: 4.5 10E3/UL (ref 4–11)

## 2024-11-14 PROCEDURE — 85027 COMPLETE CBC AUTOMATED: CPT | Performed by: INTERNAL MEDICINE

## 2024-11-14 PROCEDURE — 99232 SBSQ HOSP IP/OBS MODERATE 35: CPT | Performed by: INTERNAL MEDICINE

## 2024-11-14 PROCEDURE — 84484 ASSAY OF TROPONIN QUANT: CPT | Performed by: INTERNAL MEDICINE

## 2024-11-14 PROCEDURE — 99222 1ST HOSP IP/OBS MODERATE 55: CPT | Performed by: PHYSICIAN ASSISTANT

## 2024-11-14 PROCEDURE — 36415 COLL VENOUS BLD VENIPUNCTURE: CPT | Performed by: INTERNAL MEDICINE

## 2024-11-14 PROCEDURE — 93306 TTE W/DOPPLER COMPLETE: CPT

## 2024-11-14 PROCEDURE — 70553 MRI BRAIN STEM W/O & W/DYE: CPT | Mod: MG

## 2024-11-14 PROCEDURE — A9585 GADOBUTROL INJECTION: HCPCS | Performed by: PHYSICIAN ASSISTANT

## 2024-11-14 PROCEDURE — 120N000001 HC R&B MED SURG/OB

## 2024-11-14 PROCEDURE — 250N000009 HC RX 250: Performed by: PHYSICIAN ASSISTANT

## 2024-11-14 PROCEDURE — 255N000002 HC RX 255 OP 636: Performed by: PHYSICIAN ASSISTANT

## 2024-11-14 PROCEDURE — 71260 CT THORAX DX C+: CPT | Mod: MG

## 2024-11-14 PROCEDURE — 74177 CT ABD & PELVIS W/CONTRAST: CPT | Mod: MG

## 2024-11-14 PROCEDURE — 250N000011 HC RX IP 250 OP 636: Performed by: PHYSICIAN ASSISTANT

## 2024-11-14 PROCEDURE — 250N000013 HC RX MED GY IP 250 OP 250 PS 637: Performed by: INTERNAL MEDICINE

## 2024-11-14 PROCEDURE — 93306 TTE W/DOPPLER COMPLETE: CPT | Mod: 26 | Performed by: INTERNAL MEDICINE

## 2024-11-14 PROCEDURE — 999N000226 HC STATISTIC SLP IP EVAL DEFER: Performed by: SPEECH-LANGUAGE PATHOLOGIST

## 2024-11-14 PROCEDURE — G1010 CDSM STANSON: HCPCS

## 2024-11-14 PROCEDURE — 999N000147 HC STATISTIC PT IP EVAL DEFER: Performed by: PHYSICAL THERAPIST

## 2024-11-14 PROCEDURE — 97165 OT EVAL LOW COMPLEX 30 MIN: CPT | Mod: GO

## 2024-11-14 RX ORDER — ACETAMINOPHEN 325 MG/1
650 TABLET ORAL EVERY 4 HOURS PRN
Status: DISCONTINUED | OUTPATIENT
Start: 2024-11-14 | End: 2024-11-16 | Stop reason: HOSPADM

## 2024-11-14 RX ORDER — HYDRALAZINE HYDROCHLORIDE 10 MG/1
10 TABLET, FILM COATED ORAL EVERY 4 HOURS PRN
Status: DISCONTINUED | OUTPATIENT
Start: 2024-11-14 | End: 2024-11-14

## 2024-11-14 RX ORDER — PROCHLORPERAZINE MALEATE 5 MG/1
5 TABLET ORAL EVERY 6 HOURS PRN
Status: DISCONTINUED | OUTPATIENT
Start: 2024-11-14 | End: 2024-11-16 | Stop reason: HOSPADM

## 2024-11-14 RX ORDER — LIDOCAINE 40 MG/G
CREAM TOPICAL
Status: DISCONTINUED | OUTPATIENT
Start: 2024-11-14 | End: 2024-11-16 | Stop reason: HOSPADM

## 2024-11-14 RX ORDER — GADOBUTROL 604.72 MG/ML
8 INJECTION INTRAVENOUS ONCE
Status: COMPLETED | OUTPATIENT
Start: 2024-11-14 | End: 2024-11-14

## 2024-11-14 RX ORDER — HYDRALAZINE HYDROCHLORIDE 20 MG/ML
10-20 INJECTION INTRAMUSCULAR; INTRAVENOUS
Status: DISCONTINUED | OUTPATIENT
Start: 2024-11-14 | End: 2024-11-16 | Stop reason: HOSPADM

## 2024-11-14 RX ORDER — AMOXICILLIN 250 MG
2 CAPSULE ORAL 2 TIMES DAILY PRN
Status: DISCONTINUED | OUTPATIENT
Start: 2024-11-14 | End: 2024-11-16 | Stop reason: HOSPADM

## 2024-11-14 RX ORDER — HYDRALAZINE HYDROCHLORIDE 20 MG/ML
10 INJECTION INTRAMUSCULAR; INTRAVENOUS EVERY 4 HOURS PRN
Status: DISCONTINUED | OUTPATIENT
Start: 2024-11-14 | End: 2024-11-14

## 2024-11-14 RX ORDER — LORAZEPAM 0.5 MG/1
0.5 TABLET ORAL
Status: COMPLETED | OUTPATIENT
Start: 2024-11-14 | End: 2024-11-14

## 2024-11-14 RX ORDER — ACETAMINOPHEN 650 MG/1
650 SUPPOSITORY RECTAL EVERY 4 HOURS PRN
Status: DISCONTINUED | OUTPATIENT
Start: 2024-11-14 | End: 2024-11-16 | Stop reason: HOSPADM

## 2024-11-14 RX ORDER — POLYETHYLENE GLYCOL 3350 17 G/17G
17 POWDER, FOR SOLUTION ORAL 2 TIMES DAILY PRN
Status: DISCONTINUED | OUTPATIENT
Start: 2024-11-14 | End: 2024-11-16 | Stop reason: HOSPADM

## 2024-11-14 RX ORDER — CLOPIDOGREL BISULFATE 75 MG/1
75 TABLET ORAL DAILY
Status: DISCONTINUED | OUTPATIENT
Start: 2024-11-14 | End: 2024-11-16 | Stop reason: HOSPADM

## 2024-11-14 RX ORDER — ASPIRIN 81 MG/1
81 TABLET, CHEWABLE ORAL DAILY
Status: DISCONTINUED | OUTPATIENT
Start: 2024-11-14 | End: 2024-11-16 | Stop reason: HOSPADM

## 2024-11-14 RX ORDER — LABETALOL HYDROCHLORIDE 5 MG/ML
10-20 INJECTION, SOLUTION INTRAVENOUS EVERY 10 MIN PRN
Status: DISCONTINUED | OUTPATIENT
Start: 2024-11-14 | End: 2024-11-16 | Stop reason: HOSPADM

## 2024-11-14 RX ORDER — ONDANSETRON 4 MG/1
4 TABLET, ORALLY DISINTEGRATING ORAL EVERY 6 HOURS PRN
Status: DISCONTINUED | OUTPATIENT
Start: 2024-11-14 | End: 2024-11-16 | Stop reason: HOSPADM

## 2024-11-14 RX ORDER — IOPAMIDOL 755 MG/ML
86 INJECTION, SOLUTION INTRAVASCULAR ONCE
Status: COMPLETED | OUTPATIENT
Start: 2024-11-15 | End: 2024-11-14

## 2024-11-14 RX ORDER — AMOXICILLIN 250 MG
1 CAPSULE ORAL 2 TIMES DAILY PRN
Status: DISCONTINUED | OUTPATIENT
Start: 2024-11-14 | End: 2024-11-16 | Stop reason: HOSPADM

## 2024-11-14 RX ORDER — ASPIRIN 81 MG/1
81 TABLET ORAL DAILY
Status: DISCONTINUED | OUTPATIENT
Start: 2024-11-14 | End: 2024-11-16 | Stop reason: HOSPADM

## 2024-11-14 RX ORDER — ONDANSETRON 2 MG/ML
4 INJECTION INTRAMUSCULAR; INTRAVENOUS EVERY 6 HOURS PRN
Status: DISCONTINUED | OUTPATIENT
Start: 2024-11-14 | End: 2024-11-16 | Stop reason: HOSPADM

## 2024-11-14 RX ADMIN — CLOPIDOGREL BISULFATE 75 MG: 75 TABLET ORAL at 08:40

## 2024-11-14 RX ADMIN — SODIUM CHLORIDE 66 ML: 9 INJECTION, SOLUTION INTRAVENOUS at 23:56

## 2024-11-14 RX ADMIN — ASPIRIN 81 MG CHEWABLE TABLET 81 MG: 81 TABLET CHEWABLE at 08:39

## 2024-11-14 RX ADMIN — IOPAMIDOL 86 ML: 755 INJECTION, SOLUTION INTRAVENOUS at 23:56

## 2024-11-14 RX ADMIN — LORAZEPAM 0.5 MG: 0.5 TABLET ORAL at 20:42

## 2024-11-14 RX ADMIN — GADOBUTROL 8 ML: 604.72 INJECTION INTRAVENOUS at 21:58

## 2024-11-14 NOTE — PROGRESS NOTES
"[facility]    Medicine Progress Note - Hospitalist Service    Date of Admission:  11/13/2024    Assessment & Plan   Alicia Leonard is a 74 year old female with past medical history significant for hypertension who presents with left arm weakness. Admitted on 11/13/2024.     Left arm weakness with dysmetria   Hypertensive emergency   *Presents with acute onset left arm weakness, numbness and coordination difficulty at around 1945 11/13. SBP as high as 230 after episode per family   *Head CT no acute abnormality.   *CTA head negative.  *CTA neck mild carotid artery atherosclerosis.   *Symptoms nearly resolved with persistent numbness in finger tips.   Stroke Neurology consult requested.  I appreciate Bella Almonte's evaluation and recommendations  Per her, \"Differential includes stroke vs stroke mimic such as HTN. Her symptoms have quickly resolved and she does not have focal deficits on exam. Potentially symptoms were due to HTN with systolic 230. With stable pituitary adenoma, incidental thyroid nodule, and HTN, want to rule out MEN II/pheochromocytoma as a potential cause for her transient weakness.   Brain MRI is pending  Echocardiogram is pending  Telemetry  HgbA1c is 5.8%  lipid panel as follows (total/HDL/LDL/triglycerides): 136/46/67/113  PT/OT/SLP, they recommend home with assistance from family  Aspirin 81 mg + clopidogrel 75 mg (loaded in ED) per neurology   No statin at this point  Permissive hypertension, goal SBP <220; PRN anti-hypertensives ordered for severe elevations    Mild troponin elevation  LVH  Initial troponin 18. Possible secondary to TIA/CVA versus hypertensive emergency in setting of LVH   Trend troponin  Cardiac monitoring  Echocardiogram as above    Chronic normocytic anemia  Hgb 10.0 g/dl. 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 Has been seen at [facility] for this with plan for US guided biopsy, does not appear to have been completed per chart review  Continue outpatient follow-up         Diet: Regular Diet Adult    DVT Prophylaxis: Pneumatic Compression Devices  Fall Catheter: Not present  Lines: None     Cardiac Monitoring: ACTIVE order. Indication: Stroke, acute (48 hours)  Code Status: Full Code      Clinically Significant Risk Factors Present on Admission                        # Anemia: based on hgb <11                Disposition Plan     Medically Ready for Discharge: Anticipated in 2-4 Days      Nika Zuniga MD  Hospitalist Service  [facility]  Securely message with [employer] (more info)  Text page via [employer] Paging/Directory   ______________________________________________________________________    Interval History   \"Tired.\"  Patient's daughter, Winsome, provided most of the history.  She says patient has been up much of the night having tasks and interacting with providers.  She says patient had numbness involving her left arm, symptoms had almost completely resolved by the time paramedics arrived.  Patient denies headache, no respiratory or GI complaints.    Physical Exam   Vital Signs: Temp: (P) 99.2  F (37.3  C) Temp src: (P) Oral BP: (P) 138/67 Pulse: (P) 75   Resp: (P) 19 SpO2: (P) 98 % O2 Device: (P) None (Room air)    Weight: 0 lbs 0 oz    Constitutional: Dozing, wakes to soft voice  Respiratory: Clear to auscultation bilaterally, no crackles or wheezing  Cardiovascular: Regular rate and rhythm, normal S1 and S2, and no murmur noted  GI: Normal bowel sounds, soft, non-distended, non-tender  Skin/Integumen: No rash on exposed skin.  No lower extremity edema  Other: Mood is calm      Medical Decision Making       35 MINUTES SPENT BY ME on the date of service doing chart review, " history, exam, documentation & further activities per the note.      Data     I have personally reviewed the following data over the past 24 hrs:    5.4  \   10.0 (L)   / 211     137 104 13.0 /  78   3.4 23 0.90 \     Trop: 23 (H) BNP: N/A     TSH: N/A T4: N/A A1C: 5.8 (H)     INR:  1.10 PTT:  29   D-dimer:  N/A Fibrinogen:  N/A       Imaging results reviewed over the past 24 hrs:   Recent Results (from the past 24 hours)   CT Head w/o Contrast    Narrative    EXAM: CT HEAD W/O CONTRAST  LOCATION: [facility]  DATE: 11/13/2024    INDICATION: Code Stroke to evaluate for potential thrombolysis and thrombectomy. PLEASE READ IMMEDIATELY. Left arm weakness.  COMPARISON: None.  TECHNIQUE: Routine CT Head without IV contrast. Multiplanar reformats. Dose reduction techniques were used.    FINDINGS:  INTRACRANIAL CONTENTS: No intracranial hemorrhage, extraaxial collection, or mass effect.  No CT evidence of acute infarct. Normal parenchymal attenuation. Normal ventricles and sulci.     VISUALIZED ORBITS/SINUSES/MASTOIDS: No intraorbital abnormality. No paranasal sinus mucosal disease. Postoperative changes left mastoidectomy.    BONES/SOFT TISSUES: No acute abnormality.      Impression    IMPRESSION:  1.  No acute intracranial injury, hemorrhage, mass, or CT evidence of recent ischemia.   CTA Head Neck with Contrast    Narrative    HEAD AND NECK CT ANGIOGRAM WITH IV CONTRAST  [employer]  11/13/2024 9:40 PM CST    INDICATION: Code Stroke to evaluate for potential thrombolysis and thrombectomy. PLEASE READ IMMEDIATELY. Left arm weakness.  TECHNIQUE: Head and neck CT angiogram with IV contrast. CT images of the head and neck vessels obtained during the arterial phase of intravenous contrast administration. Axial helical 2D reconstructed images and multiplanar 3D MIP reconstructed images of   the head and neck vessels were performed by the technologist. Dose reduction techniques were  used. Vessel stenoses reported according to NASCET criteria.  CONTRAST: 67mL Isovue 370  COMPARISON: None.     FINDINGS:  HEAD CTA: The intracranial circulation is patent without evidence for aneurysm, proximal vessel occlusion, high-grade intracranial stenosis or arteriovenous malformation.  Patent dural venous sinuses.    NECK CTA: Two vessel (bovine) arch.  Carotid arteries are patent with mild atherosclerotic change.  No hemodynamically significant stenosis by NASCET criteria in either carotid system.  Patent vertebral arteries. No dissection. Bilateral axillary   lymphadenopathy is incompletely imaged.      Impression    CONCLUSION:  HEAD CTA:  1.  Negative. No vessel stenosis, occlusion or aneurysm.    NECK CTA:  1.  Mild carotid artery atherosclerosis. No dissection or hemodynamically significant narrowing in the neck by NASCET criteria.  2.  Bilateral axillary lymphadenopathy is incompletely imaged.    Findings were discussed with Dr. JASE CHANEY via telephone at 2157 hours on 11/13/2024.

## 2024-11-14 NOTE — ED NOTES
Bed: ED30  Expected date: 11/13/24  Expected time: 9:05 PM  Means of arrival: Ambulance  Comments:  Gerda 543 74F arm numbness/HTN

## 2024-11-14 NOTE — DISCHARGE INSTRUCTIONS
-Recommend home blood pressure monitoring twice daily in AM and PM, keep log and bring to medical visits    - Call 911 with any new stroke symptoms: 'BE FAST'  B - Balance issues or room spinning  E - Eyes (vision loss or double vision)  F - Facial droop  A - Arm or leg weakness  S - Speech (slurred or difficulty finding words or understanding language)  T - Time is brain!      Your risk factors for stroke or TIA (transient ischemic attack):     Your Risk Factors Your Results Goals   [x] High blood pressure BP: (!) 149/103 (11/16/24 1223) Less than 120/80   [] Cholesterol          Total 11/13/2024: 136 mg/dL   Less than 150    Triglycerides   11/13/2024: 113 mg/dL Less than 150    LDL 11/13/2024: 67 mg/dL    Less than 70    HDL 11/13/2024: 46 mg/dL         Greater than 40 (men)  Greater than 50 (women)   [] Diabetes                A1C 11/13/2024: 5.8 % Less than 5.7   [] Atrial fibrillation Atrial fibrillation noted on cardiac monitoring Manage per physician orders   [] Smoking/tobacco use   Tobacco Use      Smoking status: Never      Smokeless tobacco: Never   Quit smoking and tobacco   [] Overweight Body mass index is 27.99 kg/m .  Less than 25     Other risk factors include: carotid (neck) artery disease, other heart diseases, prior stroke or TIA, poor diet, lack of exercise, and excessive alcohol consumption.     [] Written stroke educational materials given to patient including:   - Learning about BE FAST: Stroke Warning Signs and Learning about Risk Factors for Stroke (Healthwise)   - Understanding Stroke: Key Resources After a Stroke (FOD #113059)       Know the warning signs and symptoms of stroke: BE FAST     B = Balance loss   E = Eyesight changes   F = Facial droop or numbness   A = Arm or leg weakness   S = Speech difficulty, slurred speech   T = Time to call 911 for help

## 2024-11-14 NOTE — CONSULTS
"      [facility]     Stroke Code Note          History of Present Illness     Chief Complaint: Stroke Symptoms      Alicia Leonard is a 74 year old woman with h/o HTN, pitutary adenoma, left sided mastoidectomy and repair of CSF leak (otorrohea) with abdominal fat graft on 9/6/24 with obliteration of epitympanum and mastoid antrum on 9/6/2024, Cervical cancer history 2017 status post chemo/radiation history who presents with acute onset left arm weakness at around 19:55. Patient reports that left arm was moving but not antigravity and she was unable to  things well and it also seemed like she did not have good dexterity. SBP at the time was around 230. EMS noted some drift in the left UE on their arrival. By the time of arrival to the ED, the weakness had near resolved with exam only concerning for mild weakness in the left (Grade 4) compared to the right.          Past Medical History     Stroke risk factors: HTN, b/l axillary lymphadenopathy that has not been investigated yet    Preadmission antithrombotic regimen: none    Modified [place] Score (Pre-morbid)  0                   Assessment and Plan       1.  Acute onset left arm weakness and dysmetria - rapidly improving     Intravenous Thrombolysis  Not given due to:   - minor/isolated/quickly resolving symptoms     Endovascular Treatment  Not initiated due to absence of proximal vessel occlusion     Plan:  - Use orderset: \"Ischemic Stroke Routine Admission\" or \"Ischemic Stroke No Thrombolytics/No Thrombectomy ICU Admission\"  - Place Neurology IP Stroke Consult order   - Neurochecks and Vital Signs every 4 hours   - Permissive HTN; goal SBP < 220 mmHg  - Daily aspirin 81 mg for secondary stroke prevention  - Plavix (clopidogrel) 300 mg PO loading dose x 1  - Plavix (clopidogrel) 75 mg PO Daily  - Statin: per lipid profile results (LDL goal < 70)  - MRI Brain with and without contrast    - TTE (with Bubble Study if age 60 yrs or " "less)  - Telemetry, EKG  - Bedside Glucose Monitoring  - A1c, Lipid Panel, Troponin x 3  - PT/OT/SLP  - Stroke Education  - Euthermia, Euglycemia     ___________________________________________________________________    Rick Galaviz MD  Vascular Neurology    To page me or covering stroke neurology team member, click here: [employer]  Choose \"On Call\" tab at top, then select \"NEUROLOGY/ALL SITES\" from middle drop-down box, press Enter, then look for \"stroke\" or \"telestroke\" for your site.  ___________________________________________________________________        Imaging/Labs   (personally reviewed )    CT head: No acute ischemia/hemorrhage  CTA head/neck: No LVO/critical stenoses. Mild athero  in b/l cervical ICA         Physical Examination     BP: (!) 189/87   Pulse: 82   Resp: 13   Temp: 98.3  F (36.8  C)   Temp src: Oral   SpO2: 98 %   O2 Device: None (Room air)        Wt Readings from Last 2 Encounters:   06/19/24 81.5 kg (179 lb 11.2 oz)   05/28/24 80.8 kg (178 lb 3.2 oz)       General Exam  General:  patient lying in bed without any acute distress    HEENT:  normocephalic/atraumatic  Cardio:   Cannot test given nature of tele exam  Pulmonary:  no respiratory distress  Abdomen:   Cannot test given nature of tele exam  Extremities:  Cannot test given nature of tele exam  Skin:  Cannot test given nature of tele exam. No bovious rash or injuries    Neuro Exam  Mental Status:  alert, oriented x 3, follows commands, speech clear and fluent, naming and repetition normal  Cranial Nerves:  visual fields intact (tested by nurse), EOMI with normal smooth pursuit, facial sensation intact and symmetric (tested by nurse), facial movements symmetric, hearing not formally tested but intact to conversation, no dysarthria  Motor:  able to move all limbs antigravity spontaneously with no signs of hemiparesis observed, no pronator drift   Reflexes:  unable to test (telestroke)  Sensory:  light touch sensation intact and symmetric " "throughout upper and lower extremities (assessed by nurse), no extinction on double simultaneous stimulation (assessed by nurse)  Coordination:   Mild dysmetria on FNF with the left UE  Station/Gait:  unable to test due to telestroke        Stroke Scales       NIHSS  1a. Level of Consciousness 0-->Alert, keenly responsive   1b. LOC Questions  0   1c. LOC Commands  0   2.   Best Gaze  0   3.   Visual  0   4.   Facial Palsy  0   5a. Motor Arm, Left  0   5b. Motor Arm, Right  0   6a. Motor Leg, Left  0   6b. Motor Leg, right  0   7.   Limb Ataxia 1-->Present in one limb (Left UE)   8.   Sensory  0   9.   Best Language  0   10. Dysarthria  0   11. Extinction and Inattention   0   Total 1 (11/13/24 2145)            Labs     CBC  No results found for: \"HGB\", \"HCT\", \"WBC\", \"PLT\"    BMP  Lab Results   Component Value Date     11/13/2024    POTASSIUM 3.4 11/13/2024    CHLORIDE 104 11/13/2024    CO2 23 11/13/2024    BUN 13.0 11/13/2024    CR 0.90 11/13/2024    GLC 95 11/13/2024    AUBRIE 8.5 (L) 11/13/2024       INR  INR   Date Value Ref Range Status   11/13/2024 1.10 0.85 - 1.15 Final       Data   Stroke Code Data  (for stroke code with tele)  Stroke code activated 11/13/24 2119   First stroke provider response 11/13/24 2121   Video start time 11/13/24 2139   Video end time 11/13/24 2207   Last known normal 11/13/24 1955   Time of discovery (or onset of symptoms)  11/13/24 1955   Head CT read by Stroke Neuro Provider 11/13/24 2130   Was stroke code de-escalated? Yes  11/13/24 2215     Telestroke Service Details  Type of service telemedicine diagnostic assessment of acute neurological changes   Reason telemedicine is appropriate patient requires assessment with a specialist for diagnosis and treatment of neurological symptoms   Mode of transmission secure interactive audio and video communication per Joseph   Originating site (patient location) [facility]    Distant site (provider " location) Provider remote site        Clinically Significant Risk Factors Present on Admission                                         Time Spent on this Encounter   Billing: I saw Alicia Leonard on 11/13/24 as a STROKE CODE ACTIVATION.  At the time of my evaluation, Alicia Leonard was in critical condition due to acute onset neurologic deficits consisting of dysmetria and weakness due to suspected stroke. she was at high risk of neurologic deterioration from complications of stroke or stroke reperfusion therapy.  Both intravenous thrombolysis and endovascular thrombectomy were considered, but were ultimately deferred upon completion of her emergent clinical evaluation and review of her stat neuroimaging. The stroke code was de-escalated at that time.  I spent 45 minutes critical care decision-making time emergently evaluating and managing this patient's stroke code activation in conjunction with flying squad nursing and/or house officer/emergency department physician.

## 2024-11-14 NOTE — PLAN OF CARE
Reason for Admission: Arm numbness/HTN    Cognitive/Mentation: A/Ox 4  Neuros/CMS: Intact ex LUE tingling.  VS: VSS, SBP in the 150s.   Tele: NSR.  /GI: Continent. Last BM 11/13/24.   Pulmonary: LS clear.  Pain: denies.     Drains/Lines: PIV SL  Skin: WNL  Activity: SBA  Diet: Pending dysphagia screen.     Therapies recs: Pending   Discharge: Pending    Aggression Stoplight Tool: Green    MRI pending.

## 2024-11-14 NOTE — ED PROVIDER NOTES
"  Emergency Department Note      History of Present Illness     Chief Complaint   Stroke Symptoms      HPI   Alicia Leonard is a 74 year old female who presents to the ED for evaluation of stroke symptoms. The patient reports sitting on the couch around 1955 today when she stood up and had her left arm go numb with little to no control of the arm. She notes that she could not lift it due to it \"flopping back down.\" She mentions feeling better, but still feels weak overall. She denies any chest pain, shortness of breath, numbness or tingling, or changes in vision. Patient notes having previously had an otorrhea cerebrospinal fluid leak on 9/6/24 that required a procedure at the [facility] and has since been resolved.    Independent Historian   None    Review of External Notes   10/9/24: Clinic note reviewed    Past Medical History     Medical History and Problem List   Past Medical History:   Diagnosis Date    Benign essential hypertension     Cancer (H) 2017    Cervical cancer (H)     LVH (left ventricular hypertrophy)     Pituitary microadenoma (H)     Urge incontinence of urine        Medications   No current outpatient medications on file.      Surgical History   No past surgical history on file.    Physical Exam     Patient Vitals for the past 24 hrs:   BP Temp Temp src Pulse Resp SpO2   11/13/24 2345 (!) 186/92 97.6  F (36.4  C) Oral 97 -- --   11/13/24 2315 (!) 177/81 -- -- 85 -- 99 %   11/13/24 2314 -- -- -- -- 14 --   11/13/24 2300 (!) 172/89 -- -- 78 23 98 %   11/13/24 2246 (!) 172/87 -- -- 78 13 98 %   11/13/24 2230 (!) 184/86 -- -- 84 19 99 %   11/13/24 2215 (!) 182/84 -- -- 84 26 --   11/13/24 2202 (!) 189/87 -- -- 82 13 --   11/13/24 2158 -- -- -- -- 26 --   11/13/24 2143 -- -- -- -- -- 98 %   11/13/24 2117 (!) 191/95 98.3  F (36.8  C) Oral 89 30 98 %     Physical Exam  General: No acute distress  Head: No obvious trauma to head.  Ears, Nose, Throat:  External ears normal.  Nose normal.  No pharyngeal " erythema, swelling or exudate.  Midline uvula. Moist mucus membranes.  Eyes:  Conjunctivae clear. EOMI. PERRL.  Neck: Normal range of motion.  Neck supple.   CV: Regular rate and rhythm.  No murmurs.      Respiratory: Effort normal and breath sounds normal.  No wheezing or crackles.   Gastrointestinal: Soft.  No distension. There is no tenderness.  There is no rigidity, no rebound and no guarding.   Musculoskeletal: Normal range of motion.  Non tender extremities to palpations. No lower extremity edema  Neuro: Alert. Moving all extremities appropriately.  Normal speech. CN II-XII grossly intact, no pronator drift, normal finger-nose-finger, visual fields intact. Left  strength is 4+/5. Sensation intact to light touch in all 4 extremities.   Skin: Skin is warm and dry.  No rash noted.   Psych: Normal mood and affect. Behavior is normal.       Diagnostics     Lab Results   Labs Ordered and Resulted from Time of ED Arrival to Time of ED Departure   BASIC METABOLIC PANEL - Abnormal       Result Value    Sodium 137      Potassium 3.4      Chloride 104      Carbon Dioxide (CO2) 23      Anion Gap 10      Urea Nitrogen 13.0      Creatinine 0.90      GFR Estimate 67      Calcium 8.5 (*)     Glucose 95     TROPONIN T, HIGH SENSITIVITY - Abnormal    Troponin T, High Sensitivity 18 (*)    CBC WITH PLATELETS AND DIFFERENTIAL - Abnormal    WBC Count 5.4      RBC Count 3.35 (*)     Hemoglobin 10.0 (*)     Hematocrit 31.2 (*)     MCV 93      MCH 29.9      MCHC 32.1      RDW 14.2      Platelet Count 211      % Neutrophils 60      % Lymphocytes 26      % Monocytes 11      % Eosinophils 2      % Basophils 1      % Immature Granulocytes 0      NRBCs per 100 WBC 0      Absolute Neutrophils 3.2      Absolute Lymphocytes 1.4      Absolute Monocytes 0.6      Absolute Eosinophils 0.1      Absolute Basophils 0.0      Absolute Immature Granulocytes 0.0      Absolute NRBCs 0.0     INR - Normal    INR 1.10     PARTIAL THROMBOPLASTIN TIME -  Normal    aPTT 29     GLUCOSE MONITOR NURSING POCT   RBC AND PLATELET MORPHOLOGY    RBC Morphology Confirmed RBC Indices      Platelet Assessment        Value: Automated Count Confirmed. Platelet morphology is normal.       Imaging   CTA Head Neck with Contrast   Final Result   CONCLUSION:   HEAD CTA:   1.  Negative. No vessel stenosis, occlusion or aneurysm.      NECK CTA:   1.  Mild carotid artery atherosclerosis. No dissection or hemodynamically significant narrowing in the neck by NASCET criteria.   2.  Bilateral axillary lymphadenopathy is incompletely imaged.      Findings were discussed with Dr. JASE CHANEY via telephone at 2157 hours on 11/13/2024.      CT Head w/o Contrast   Final Result   IMPRESSION:   1.  No acute intracranial injury, hemorrhage, mass, or CT evidence of recent ischemia.      Echocardiogram Complete - For age > 60 yrs    (Results Pending)   MR Brain w/o & w Contrast    (Results Pending)       EKG   ECG results from 11/13/24   EKG 12-lead, tracing only     Value    Systolic Blood Pressure     Diastolic Blood Pressure     Ventricular Rate 85    Atrial Rate 85    CO Interval 154    QRS Duration 82        QTc 445    P Axis 73    R AXIS 80    T Axis 134    Interpretation ECG      Sinus rhythm  No previous ECGs available           Independent Interpretation   CT Head: No intracranial hemorrhage or midline shift.    ED Course      Medications Administered   Medications   LORazepam (ATIVAN) tablet 0.5 mg (has no administration in time range)   lidocaine 1 % 0.1-1 mL (has no administration in time range)   lidocaine (LMX4) cream (has no administration in time range)   sodium chloride (PF) 0.9% PF flush 3 mL (has no administration in time range)   sodium chloride (PF) 0.9% PF flush 3 mL (has no administration in time range)   acetaminophen (TYLENOL) tablet 650 mg (has no administration in time range)     Or   acetaminophen (TYLENOL) Suppository 650 mg (has no administration in time range)    melatonin tablet 1 mg (has no administration in time range)   senna-docusate (SENOKOT-S/PERICOLACE) 8.6-50 MG per tablet 1 tablet (has no administration in time range)     Or   senna-docusate (SENOKOT-S/PERICOLACE) 8.6-50 MG per tablet 2 tablet (has no administration in time range)   polyethylene glycol (MIRALAX) Packet 17 g (has no administration in time range)   ondansetron (ZOFRAN ODT) ODT tab 4 mg (has no administration in time range)     Or   ondansetron (ZOFRAN) injection 4 mg (has no administration in time range)   benzocaine-menthol (CHLORASEPTIC) 6-10 MG lozenge 1 lozenge (has no administration in time range)   prochlorperazine (COMPAZINE) injection 5 mg (has no administration in time range)     Or   prochlorperazine (COMPAZINE) tablet 5 mg (has no administration in time range)   aspirin EC tablet 81 mg (has no administration in time range)     Or   aspirin (ASA) chewable tablet 81 mg (has no administration in time range)   labetalol (NORMODYNE/TRANDATE) injection 10-20 mg (has no administration in time range)     Or   hydrALAZINE (APRESOLINE) injection 10-20 mg (has no administration in time range)   clopidogrel (PLAVIX) tablet 75 mg (has no administration in time range)   iopamidol (ISOVUE-370) solution 67 mL (67 mLs Intravenous $Given 11/13/24 2125)     And   sodium chloride 0.9 % bag for CT scan flush use (100 mLs As instructed $Given 11/13/24 2125)   aspirin (ASA) chewable tablet 81 mg (81 mg Oral $Given 11/13/24 2224)   clopidogrel (PLAVIX) tablet 300 mg (300 mg Oral $Given 11/13/24 2224)       Procedures   Procedures     Discussion of Management   Admitting Hospitalist, Dr. Agosto    ED Course   ED Course as of 11/14/24 0146   Wed Nov 13, 2024 2126 Talked to Stroke/Neuro about patient treatment plan   2214 I spoke to stroke neuro       Additional Documentation  None    Medical Decision Making / Diagnosis     CMS Diagnoses: None    MIPS       None    MDM   Alicia Leonard is a 74 year old female  presenting with left upper extremity weakness.  It seems that her weakness is improving, but she is not currently at baseline.  A tier 1 code stroke is called shortly after arrival.  CT scan shows no acute intracranial hemorrhage and no large vessel occlusion.  I discussed the patient with stroke neurology and radiology.  Stroke neurology evaluates the patient and they recommend admission for stroke workup.  They recommend giving a baby aspirin and Plavix loading dose.  The patient remains in stable condition.  I discussed the patient with the hospitalist who agreed to admit the patient their service.  She is transported to the floor.    Disposition   The patient was admitted to the hospital.     Diagnosis     ICD-10-CM    1. LUE weakness  R29.898            Discharge Medications   Current Discharge Medication List            Scribe Disclosure:  I, Russell Kitchen, am serving as a scribe at 9:31 PM on 11/13/2024 to document services personally performed by Gee Aragon MD based on my observations and the provider's statements to me.        Gee Aragon MD  11/14/24 0152

## 2024-11-14 NOTE — PLAN OF CARE
PT: Chart reviewed and orders received. Discussed with OT, pt currently up IND, no PT needs at this time. Will complete orders and defer discharge recommendations to OT.

## 2024-11-14 NOTE — PHARMACY-ADMISSION MEDICATION HISTORY
Pharmacist Admission Medication History    Admission medication history is complete. The information provided in this note is only as accurate as the sources available at the time of the update.    Information Source(s): Patient and CareEverywhere/SureScripts via phone    Pertinent Information:     Changes made to PTA medication list:  Added: None  Deleted: clotrimazole cream  Changed: None    Allergies reviewed with patient and updates made in EHR: yes    Medication History Completed By: YEVGENIY NGUYEN RPH 11/14/2024 8:05 AM    PTA Med List   Medication Sig Last Dose/Taking    verapamil ER (VERELAN) 120 MG 24 hr capsule Take 120 mg by mouth daily. 11/12/2024

## 2024-11-14 NOTE — PLAN OF CARE
SLP: Orders received. Chart reviewed and discussed with care team. SLP not indicated due to passing the swallow screen and just finished breakfast reporting no problems swallowing. Her speech/language is intact, no skilled needs indicated at this time. Defer discharge recommendations to the medical team. Will complete orders.

## 2024-11-14 NOTE — ED NOTES
[facility]  ED Nurse Handoff Report    ED Chief complaint: Stroke Symptoms      ED Diagnosis:   Final diagnoses:   LUE weakness       Code Status: confirm w/ hospitalist    Allergies: No Known Allergies    Patient Story: Patient Story: Pt BIBA from home w/ left arm 'floopiness' and HTN. Ran out of BP meds yesterday. LUE dexterity diminished.   Focused Assessment:  A&Ox4, /89 - permissive HTN. HR 70s. 98% on room air.     Treatments and/or interventions provided:   CTA Head Neck with Contrast   Final Result   CONCLUSION:   HEAD CTA:   1.  Negative. No vessel stenosis, occlusion or aneurysm.      NECK CTA:   1.  Mild carotid artery atherosclerosis. No dissection or hemodynamically significant narrowing in the neck by NASCET criteria.   2.  Bilateral axillary lymphadenopathy is incompletely imaged.      Findings were discussed with Dr. JASE CHANEY via telephone at 2157 hours on 11/13/2024.      CT Head w/o Contrast   Final Result   IMPRESSION:   1.  No acute intracranial injury, hemorrhage, mass, or CT evidence of recent ischemia.         Medications   iopamidol (ISOVUE-370) solution 67 mL (67 mLs Intravenous $Given 11/13/24 2125)     And   sodium chloride 0.9 % bag for CT scan flush use (100 mLs As instructed $Given 11/13/24 2125)   aspirin (ASA) chewable tablet 81 mg (81 mg Oral $Given 11/13/24 2224)   clopidogrel (PLAVIX) tablet 300 mg (300 mg Oral $Given 11/13/24 2224)      Patient's response to treatments and/or interventions: stable    To be done/followed up on inpatient unit:  monitor BP    Does this patient have any cognitive concerns?:  none    Activity level - Baseline/Home:  Independent  Activity Level - Current:   Stand with Assist    Patient's Preferred language: English   Needed?: No    Isolation: None  Infection: Not Applicable  Patient tested for COVID 19 prior to admission: NO  Bariatric?: No    Vital Signs:   Vitals:    11/13/24 2215 11/13/24 2230 11/13/24 2246  11/13/24 2300   BP: (!) 182/84 (!) 184/86 (!) 172/87 (!) 172/89   Pulse: 84 84 78 78   Resp: 26 19 13 23   Temp:       TempSrc:       SpO2:  99% 98% 98%       Cardiac Rhythm:     Was the PSS-3 completed:   Yes  What interventions are required if any?               Family Comments: daughter aware of admission  OBS brochure/video discussed/provided to patient/family: N/A              Name of person given brochure if not patient:               Relationship to patient:     For the majority of the shift this patient's behavior was Green.   Behavioral interventions performed were rounding.    ED NURSE PHONE NUMBER: [phone]

## 2024-11-14 NOTE — PROGRESS NOTES
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 Not on PTA ASA or statin.    She presented to ED 11/13/24 with left arm weakness. She had sudden onset left arm weakness and incoordination at 1930 that day.  Son, who is a neuro CC nurse, measured BP at home and found systolic 230s. On arrival to ED, was determined to not be a candidate for TNK as her symptoms were rapidly improving. CTH negative for acute pathology. CTA head/neck with mild bilateral cervical ICA stenosis.    She describes that she was in her chair and noticed suddenly that her Left arm felt like it was \"disconnected from her brain\" and wouldn't do what she wanted it to do. She felt it was more incoordinated but also a bit weak. Does have some chronic fingertip tingling that is still persistent. Today she still feels that the LUE is slightly incoordinated although significantly improved.    MRI brain and CT CAP ordered and pending. Denies HA, changes to vision, dizziness, fevers, chills, weight loss, nausea, worsening weakness, or pain elsewhere.   She does not regularly check her blood pressure, but when she does, it tends to be in the 150s-180s.     She denies history of smoking. No alcohol use. Endorses significant snoring at night. Denies fevers, night sweats, or unintentional weight loss.    Stroke Evaluation Summarized    MRI/Head CT MRI: Pending  CT head: Negative for acute pathology   Intracranial Vasculature CTA head: No significant stenosis or LVO   Cervical vasculature CTA neck: Mild bilateral carotid " atherosclerosis, no significant stenosis, bilateral axillary lymphadenopathy     Echocardiogram TTE: Normal LV thickness, grade 1/early diastolic dysfunction, hyperdynamic LV, EF> 70%, no WMA, RV normal, normal bilateral atria size, mild trileaflet aortic sclerosis, SR   EKG/Telemetry Sinus rhythm   Minimal voltage criteria for LVH, may be normal variant ( Sokolow-Gaston )   T wave abnormality, consider inferolateral ischemia    Other CT chest/abdomen/pelvis: Pending     LDL  11/13/2024: 67 mg/dL   A1C  11/13/2024: 5.8 %   Troponin 11/14/2024: 23 ng/L       Impression/Recommendations:  Left upper extremity weakness and dysmetria, suspect acute ischemic stroke, MRI pending to better delineate the etiology, possibly Small vessel related in setting of poorly controlled hypertension versus embolic stroke of undetermined source (ESUS)  -Neuro checks and vitals every 4 hours  -ASA 81 mg daily indefinitely  -Plavix 75 mg daily x 21 days  -telemetry, 14 day Zio Patch to be mailed out at discharge if no atrial fibrillation found on telemetry (not yet ordered)  -Await MRI brain  - No need for statin at this time (LDL at goal 40-70), <40 increases risk of intracranial hemorrhage  -Recommend Mediterranean diet  -PT/OT/SPT  -Euthermia, euglycemia, eunatremia  -Stroke Education    2. Stable pituitary adenoma, incidental thyroid nodule, HTN, evaluate for MEN 2/pheochromocytoma  -CT CAP ordered and pending    3. Hypertension, poorly controlled  -appreciate management per primary team  -Inpatient SBP goal <220 x 24 hours then <180   -Outpatient BP goal <130/80 , with tighter control if tolerated  -Recommend home blood pressure monitoring twice daily in AM and PM, keep log and bring to medical visits    4. Prediabetes, new  -appreciate management per primary team  -Educated on dietary modifications  -blood glucose monitoring, long term A1c goal <7%    5.  Significant snoring, query underlying sleep apnea  - Follow-up outpatient with  "sleep medicine clinic for testing (not yet ordered)    Discussed with vascular neurology attending, Dr. Roberts    DVT Prophylaxis: SCDs, okay with pharmacologic VTE ppx      Patient Follow-up    - final recommendation pending work-up  -She would like to establish care with a new PCP    Thank you for this consult. We will continue to follow.     Original note by: Domitila Jacob-Post, MS4    Edited by: Milla Ruiz PA-C  Vascular Neurology    To page me or covering stroke neurology team member, click here: [facility]  Choose \"On Call\" tab at top, then select \"NEUROLOGY/ALL SITES\" from middle drop-down box, press Enter, then look for \"stroke\" or \"telestroke\" for your site.  _____________________________________________________    Clinically Significant Risk Factors Present on Admission                        # Anemia: based on hgb <11                     Past Medical History    Past Medical History:   Diagnosis Date    Benign essential hypertension     Cancer (H) 2017    Cervical cancer (H)     LVH (left ventricular hypertrophy)     Pituitary microadenoma (H)     Urge incontinence of urine      Medications   Home Meds  Prior to Admission medications    Medication Sig Start Date End Date Taking? Authorizing Provider   verapamil ER (VERELAN) 120 MG 24 hr capsule Take 120 mg by mouth daily.   Yes Unknown, Entered By History       Scheduled Meds  Current Facility-Administered Medications   Medication Dose Route Frequency Provider Last Rate Last Admin    aspirin EC tablet 81 mg  81 mg Oral Daily Kirk Agosto MD        Or    aspirin (ASA) chewable tablet 81 mg  81 mg Oral or NG Tube Daily Kirk Agosto MD   81 mg at 11/14/24 0839    clopidogrel (PLAVIX) tablet 75 mg  75 mg Oral or NG Tube Daily Kirk Agosto MD   75 mg at 11/14/24 0840    sodium chloride (PF) 0.9% PF flush 3 mL  3 mL Intracatheter Q8H Kirk Agosto MD   3 mL at 11/14/24 0840       Infusion Meds  Current " Facility-Administered Medications   Medication Dose Route Frequency Provider Last Rate Last Admin       Allergies   No Known Allergies       PHYSICAL EXAMINATION   Temp:  [97.6  F (36.4  C)-99.2  F (37.3  C)] 98.3  F (36.8  C)  Pulse:  [] 77  Resp:  [13-30] 20  BP: (135-192)/(67-95) 159/80  SpO2:  [97 %-99 %] 98 %    General Exam  General:  patient lying in bed without any acute distress, interactive    HEENT:  normocephalic/atraumatic  Extremities:  No gross deformities  Skin:  intact, warm/dry     Neuro Exam  Mental Status:  alert, oriented x 3, follows complex commands, speech clear and fluent, able to spell WORLD forwards and backwards, visual comprehension to items in room intact  Cranial Nerves:  visual fields intact, PERRL, EOMI with normal smooth pursuit, facial sensation intact and symmetric, facial movements symmetric, hearing not formally tested but intact to conversation, palate elevation symmetric and uvula midline, no dysarthria, shoulder shrug strong bilaterally, tongue protrusion midline  Motor:  normal muscle tone and bulk, no abnormal movements, able to move all limbs spontaneously, strength 5/5 throughout upper and lower extremities, mild pronator drift to left arm   Reflexes:  no clonus, toes down-going  Sensory:  light touch sensation intact and symmetric throughout upper and lower extremities, does have acute on chronic subjective tingling to her L fingertips, no extinction on double simultaneous stimulation Is having pinpoint pain to palpation to her left lateral neck along her middle STM. She says that this is residual from her left mastoidectomy for CSF leak on 9/6/24.   Coordination:  normal finger-to-nose and heel-to-shin bilaterally without appreciated dysmetria, she still feels there is some subjective incoordination to the LUE  Station/Gait:  deferred    Stroke Scales  NIHSS  1a. Level of Consciousness 0-->Alert, keenly responsive   1b. LOC Questions 0-->Answers both questions  "correctly   1c. LOC Commands 0-->Performs both tasks correctly   2.   Best Gaze 0-->Normal   3.   Visual 0-->No visual loss   4.   Facial Palsy 0-->Normal symmetrical movements   5a. Motor Arm, Left 0-->No drift, limb holds 90 (or 45) degrees for full 10 secs (Mild pronation of left hand)   5b. Motor Arm, Right 0-->No drift, limb holds 90 (or 45) degrees for full 10 secs   6a. Motor Leg, Left 0-->No drift, leg holds 30 degree position for full 5 secs   6b. Motor Leg, right 0-->No drift, leg holds 30 degree position for full 5 secs   7.   Limb Ataxia 0-->Absent   8.   Sensory 0-->Normal, no sensory loss   9.   Best Language 0-->No aphasia, normal   10. Dysarthria 0-->Normal   11. Extinction and Inattention  0-->No abnormality   Total 0 (11/14/24 1002)       Imaging  I personally reviewed all imaging; relevant findings per HPI.    Labs Data   CBC  Recent Labs   Lab 11/14/24  0838 11/13/24  2144   WBC 4.5 5.4   RBC 3.64* 3.35*   HGB 10.9* 10.0*   HCT 33.1* 31.2*    211     Basic Metabolic Panel   Recent Labs   Lab 11/14/24  1300 11/14/24  0739 11/13/24  2144   NA  --   --  137   POTASSIUM  --   --  3.4   CHLORIDE  --   --  104   CO2  --   --  23   BUN  --   --  13.0   CR  --   --  0.90   * 78 95   AUBRIE  --   --  8.5*     Liver Panel  No results for input(s): \"PROTTOTAL\", \"ALBUMIN\", \"BILITOTAL\", \"ALKPHOS\", \"AST\", \"ALT\", \"BILIDIRECT\" in the last 168 hours.  INR    Recent Labs   Lab Test 11/13/24  2144   INR 1.10           Stroke Consult Data Data   This was a non-emergent, non-telestroke consult.  I have personally spent a total of 70 minutes providing care today, time spent in reviewing medical records and devising the plan as recorded above.        *All or a portion of this note was generated using voice recognition software and may contain transcription errors.       "

## 2024-11-14 NOTE — ED TRIAGE NOTES
"Patient BIBA from home. Around 1955 complained of left arm weakness \"floppiness\" Left arm drift in field. BP meds ran out yesterday. .         "

## 2024-11-14 NOTE — H&P
[facility]    History and Physical - Hospitalist Service       Date of Admission:  11/13/2024    Assessment & Plan   Alicia Leonard is a 74 year old female with past medical history significant for hypertension who presents with left arm weakness. Admitted on 11/13/2024.     Left arm weakness with dysmetria   Hypertensive emergency   *Presents with acute onset left arm weakness, numbness and coordination difficulty at around 1945 11/13. SBP as high as 230 after episode per family   *Head CT no acute abnormality.   *CTA head negative.  *CTA neck mild carotid artery atherosclerosis.   *Symptoms nearly resolved with persistent numbness in finger tips.   - Neurology consulted  - MRI brain  - Echocardiogram   - Telemetry  - HgbA1c, lipid panel  - PT/OT/SLP  - NPO pending bedside RN swallow evaluation, conditional diet ordered  - Aspirin 81 mg + clopidogrel 75 mg (loaded in ED) per neurology   - Statin pending lipid panel   - Permissive hypertension, goal SBP <220; PRN anti-hypertensives ordered for severe elevations    Mild troponin elevation  LVH  Initial troponin 18. Possible secondary to TIA/CVA versus hypertensive emergency in setting of LVH   - Trend troponin  - Cardiac monitoring  - Echocardiogram as above    Chronic normocytic anemia  Hgb 10.0 g/dl. 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 Has been seen at [facility] for this with plan for US guided biopsy, does not appear to have been completed per chart review  - Continue outpatient follow-up        Diet: Regular Diet Adult    DVT Prophylaxis: Pneumatic Compression Devices  Fall Catheter: Not present  Code Status: Full Code     Disposition Plan   Admit to inpatient. Anticipate greater than or  equal to 2 midnights prior to discharge.      Medically Ready for Discharge: Anticipated in 2-4 Days       Entered: Kirk Agosto MD 11/14/2024, 12:32 AM     The patient's care was discussed with the ED provider, patient and patient's daughter       Clinically Significant Risk Factors Present on Admission                        # Anemia: based on hgb <11                       Kirk Agosto MD  [facility]    ______________________________________________________________________    Chief Complaint   Left arm numbness, weakness, coordination difficulty     History of Present Illness   Alicia Leonard is a 74 year old female who presents with the above chief complaint.    History is obtained from the patient, discussion with ED provider and review of medical record. The patient reports around 1945 on 11/13 she got up to use the bathroom, as she was walking to the bathroom her left arm began to feel abnormal and when she got to the bathroom she had difficulty coordinating with her left hand while she was pulling her pants up.  Her left arm felt weaker as well.  She denies any symptoms in her legs.  She told her family about her symptoms, had her blood pressure checked and noted systolic to be in the 230s.  Her daughter also reports possibly a mild left facial droop.  EMS was called and she was brought to the [facility] emergency department for further evaluation.  She denies any prior history of stroke or TIA.  Her symptoms have nearly resolved to baseline, though she continues to have some numbness in her 1st through 3rd fingertips.    In the Emergency Department, the patient was seen by Dr. Fontenot, with whom I discussed the patient's presenting symptoms and emergency department course.  Initial vital signs were a temperature of 98.3F, HR 89, /95, RR 30, SpO2 98% on room air. Pertinent work-up as noted in A&P. The patient received aspirin, clopidogrel and Hospitalists were  contacted for admission to the hospital.     Past Medical History    I have reviewed this patient's medical history and updated it with pertinent information if needed.   Past Medical History:   Diagnosis Date    Benign essential hypertension     Cancer (H) 2017    Cervical cancer (H)     LVH (left ventricular hypertrophy)     Pituitary microadenoma (H)     Urge incontinence of urine        Past Surgical History   I have reviewed this patient's surgical history and updated it with pertinent information if needed.  No past surgical history on file.      Social History   I have reviewed this patient's social history and updated it with pertinent information if needed.  Social History     Tobacco Use    Smoking status: Never    Smokeless tobacco: Never   Vaping Use    Vaping status: Never Used   Substance Use Topics    Alcohol use: Never    Drug use: Never        Family History   I have reviewed this patient's family history and updated it with pertinent information if needed.   Family History   Problem Relation Age of Onset    Cancer Sister     Diabetes Sister         Prior to Admission Medications  - Pending pharmacy reconciliation   Verapamil    Allergies   No Known Allergies    Physical Exam   Vital Signs: Temp: 97.6  F (36.4  C) Temp src: Oral BP: (!) 186/92 Pulse: 97   Resp: 14 SpO2: 99 % O2 Device: None (Room air)    Weight: 0 lbs 0 oz    Constitutional: Well-appearing, NAD  Eyes: PERRL, EOMI  Respiratory: Clear to auscultation bilaterally, good air movement, normal effort   Cardiovascular: RRR, no m/r/g. No peripheral edema.    GI: Soft, non-tender, non-distended. No rebound tenderness or guarding.    Skin: Warm, dry   Neurologic: Alert. Responding to questions appropriately. Following commands. Oriented to person, place and time. Face symmetric. Tongue midline. 5/5 upper and lower extremity strength symmetric bilaterally. Mild difficulty with finger-nose-finger testing on the left compared to right. Intact  heal-shin testing symmetric bilaterally.    Psychiatric: Normal affect, appropriate      Data   Data reviewed today: I reviewed all medications, new labs and imaging results over the last 24 hours. I personally reviewed the EKG tracing showing sinus rhythm, T wave inversion I, aVL .    Recent Labs   Lab 11/13/24  2144   WBC 5.4   HGB 10.0*   MCV 93      INR 1.10      POTASSIUM 3.4   CHLORIDE 104   CO2 23   BUN 13.0   CR 0.90   ANIONGAP 10   AUBRIE 8.5*   GLC 95       Recent Results (from the past 24 hours)   CT Head w/o Contrast    Narrative    EXAM: CT HEAD W/O CONTRAST  LOCATION: [facility]  DATE: 11/13/2024    INDICATION: Code Stroke to evaluate for potential thrombolysis and thrombectomy. PLEASE READ IMMEDIATELY. Left arm weakness.  COMPARISON: None.  TECHNIQUE: Routine CT Head without IV contrast. Multiplanar reformats. Dose reduction techniques were used.    FINDINGS:  INTRACRANIAL CONTENTS: No intracranial hemorrhage, extraaxial collection, or mass effect.  No CT evidence of acute infarct. Normal parenchymal attenuation. Normal ventricles and sulci.     VISUALIZED ORBITS/SINUSES/MASTOIDS: No intraorbital abnormality. No paranasal sinus mucosal disease. Postoperative changes left mastoidectomy.    BONES/SOFT TISSUES: No acute abnormality.      Impression    IMPRESSION:  1.  No acute intracranial injury, hemorrhage, mass, or CT evidence of recent ischemia.   CTA Head Neck with Contrast    Narrative    HEAD AND NECK CT ANGIOGRAM WITH IV CONTRAST  [facility]  11/13/2024 9:40 PM CST    INDICATION: Code Stroke to evaluate for potential thrombolysis and thrombectomy. PLEASE READ IMMEDIATELY. Left arm weakness.  TECHNIQUE: Head and neck CT angiogram with IV contrast. CT images of the head and neck vessels obtained during the arterial phase of intravenous contrast administration. Axial helical 2D reconstructed images and multiplanar 3D MIP reconstructed images  of   the head and neck vessels were performed by the technologist. Dose reduction techniques were used. Vessel stenoses reported according to NASCET criteria.  CONTRAST: 67mL Isovue 370  COMPARISON: None.     FINDINGS:  HEAD CTA: The intracranial circulation is patent without evidence for aneurysm, proximal vessel occlusion, high-grade intracranial stenosis or arteriovenous malformation.  Patent dural venous sinuses.    NECK CTA: Two vessel (bovine) arch.  Carotid arteries are patent with mild atherosclerotic change.  No hemodynamically significant stenosis by NASCET criteria in either carotid system.  Patent vertebral arteries. No dissection. Bilateral axillary   lymphadenopathy is incompletely imaged.      Impression    CONCLUSION:  HEAD CTA:  1.  Negative. No vessel stenosis, occlusion or aneurysm.    NECK CTA:  1.  Mild carotid artery atherosclerosis. No dissection or hemodynamically significant narrowing in the neck by NASCET criteria.  2.  Bilateral axillary lymphadenopathy is incompletely imaged.    Findings were discussed with Dr. JASE CHANEY via telephone at 2157 hours on 11/13/2024.

## 2024-11-14 NOTE — PHARMACY
Pharmacy Consult to evaluate for medication related stroke core measures    Alicia Leonard, 74 year old female admitted for Acute onset left arm weakness and dysmetria on 11/13/2024.    Thrombolytic was not given because of rapidly improved    VTE Prophylaxis SCDs /PCDs placed on D1, as appropriate prior to end of hospital day 2.    Antithrombotic: aspirin and plavix started on D1, as appropriate by end of hospital day 2. Continue antithrombotic therapy on discharge to meet quality measures, unless contraindicated.    Anticoagulation if history of A-fib/flutter: Patient does not have history of A-fib/flutter - anticoagulation not required for medication related stroke core measures.     LDL Cholesterol Calculated   Date Value Ref Range Status   11/13/2024 67 <100 mg/dL Final       Statin---pending lipid profile    Recommendations: None at this time    Thank you for the consult.    Rosangela Ortega [facility] 11/14/2024 9:22 AM

## 2024-11-14 NOTE — CONSULTS
"Nutrition Admission Screen:  Have you recently lost weight without trying? \"UNSURE\"  Have you been eating poorly because of a decreased appetite? \"NO\"    Chart reviewed    Diet: Regular    Visited with pt this afternoon  \"Starving!\"  Ordered a large breakfast and lunch  Hoping to go home later today    Ht: 5'6\"  IBW: 59.1 kg  Wt hx:  Wt Readings from Last 10 Encounters:   06/19/24 81.5 kg (179 lb 11.2 oz)   05/28/24 80.8 kg (178 lb 3.2 oz)   01/11/24 80.7 kg (178 lb)     09/07/24 : 81 kg (178 lb)     No nutrition concerns at this time    Monika Lozoya RD, LD  Clinical Dietitian - [facility]   Pager - [phone]              "

## 2024-11-14 NOTE — PROGRESS NOTES
RECEIVING UNIT ED HANDOFF REVIEW    ED Nurse Handoff Report was reviewed by: Jaye West RN on November 13, 2024 at 11:14 PM

## 2024-11-14 NOTE — PROGRESS NOTES
"   11/14/24 0900   Appointment Info   Signing Clinician's Name / Credentials (OT) Tatiana Souza OTR/L   Living Environment   People in Home child(chuck), adult   Current Living Arrangements house   Transportation Anticipated family or friend will provide   Living Environment Comments Pt lives in daughters home, living quarters is 16 stairs down to lower level, pt goes up and down multiple times a day. Does not use any AD at baseline   Self-Care   Usual Activity Tolerance good   Current Activity Tolerance moderate   Regular Exercise   (pt used to walk 3 miles a day before cancer dx, then reduced to 1 mile and now just is up and moving throughout the day and stopped doing intentional acitivity)   Equipment Currently Used at Home none   Fall history within last six months no   Activity/Exercise/Self-Care Comment IND w/ all ADLs   Instrumental Activities of Daily Living (IADL)   IADL Comments IND w/ IADLs, pt enjoys playing cards w/ friends, cooking, paint or color by numbers.   General Information   Onset of Illness/Injury or Date of Surgery 11/13/24   Referring Physician Kirk Agosto MD   Patient/Family Therapy Goal Statement (OT) return home   Additional Occupational Profile Info/Pertinent History of Current Problem Per chart \"Alicia Leonard is a 74 year old female with past medical history significant for hypertension who presents with left arm weakness. \"   Cognitive Status Examination   Orientation Status orientation to person, place and time   Cognitive Status Comments No deficits observed   Visual Perception   Visual Impairment/Limitations WNL   Sensory   Sensory Comments Mild impairments still in digits 1-3 in tips of fingers,   Pain Assessment   Patient Currently in Pain No   Posture   Posture not impaired   Range of Motion Comprehensive   General Range of Motion no range of motion deficits identified   Strength Comprehensive (MMT)   Comment, General Manual Muscle Testing (MMT) Assessment Very slight " L weakness 4/5 however able to hold against resistance   Coordination   Coordination Comments L finger to nose slower and more deliberate but still accurate   Bed Mobility   Comment (Bed Mobility) IND   Transfers   Transfer Comments IND   Balance   Balance Comments SBA-IND no AD, >300' amb no AD, 4 stairs x2 reps no use of railing IND   Activities of Daily Living   Additional Documentation   (All ADLs WNL at this time per clinical judgement, IND w/ all transfers)   Clinical Impression   Criteria for Skilled Therapeutic Interventions Met (OT) Evaluation only  (brief education on activity tolerance and intentional exercise to maintain heart and cardiovascular health, daughter present and also reinforcing to patient)   OT Total Evaluation Time   OT Eval, Low Complexity Minutes (33905) 14   OT Discharge Planning   OT Plan discharge, no IP therapy needs identified   OT Discharge Recommendation (DC Rec) home   OT Rationale for DC Rec Pt functioning at baseline, slight LUE weakness still and tingling/numbness in fingertips but strength and sensation is nearly at baseline. Expect pt will be safe to return to home w/ assist from family who she lives w/ for strenuous IADLs. Encouraged to monitor BP daily and after activity since after ambulation today BP was elevated to 192/89.   OT Brief overview of current status Goals of therapy will be to address safe mobility and make recs for d/c to next level of care. Pt and RN will continue to follow all falls risk precautions as documented by RN staff while hospitalized  (IND w/ all mobility and transfers, RN ok'd no alarms after seeing work w/ therapy)   Total Session Time   Total Session Time (sum of timed and untimed services) 14

## 2024-11-15 ENCOUNTER — ORDERS ONLY (AUTO-RELEASED) (OUTPATIENT)
Dept: MEDSURG UNIT | Facility: CLINIC | Age: 74
End: 2024-11-15

## 2024-11-15 ENCOUNTER — APPOINTMENT (OUTPATIENT)
Dept: CARDIOLOGY | Facility: CLINIC | Age: 74
DRG: 065 | End: 2024-11-15
Attending: NURSE PRACTITIONER
Payer: MEDICARE

## 2024-11-15 DIAGNOSIS — I63.411 CEREBROVASCULAR ACCIDENT (CVA) DUE TO EMBOLISM OF RIGHT MIDDLE CEREBRAL ARTERY (H): ICD-10-CM

## 2024-11-15 LAB
ALBUMIN UR-MCNC: 70 MG/DL
APPEARANCE UR: ABNORMAL
BACTERIA #/AREA URNS HPF: ABNORMAL /HPF
BILIRUB UR QL STRIP: NEGATIVE
COLOR UR AUTO: ABNORMAL
GLUCOSE UR STRIP-MCNC: NEGATIVE MG/DL
HGB UR QL STRIP: ABNORMAL
KETONES UR STRIP-MCNC: NEGATIVE MG/DL
LEUKOCYTE ESTERASE UR QL STRIP: ABNORMAL
NITRATE UR QL: NEGATIVE
PH UR STRIP: 7 [PH] (ref 5–7)
POTASSIUM SERPL-SCNC: 3.4 MMOL/L (ref 3.4–5.3)
RBC URINE: 91 /HPF
SP GR UR STRIP: 1.02 (ref 1–1.03)
SQUAMOUS EPITHELIAL: 2 /HPF
UROBILINOGEN UR STRIP-MCNC: NORMAL MG/DL
WBC CLUMPS #/AREA URNS HPF: PRESENT /HPF
WBC URINE: >182 /HPF

## 2024-11-15 PROCEDURE — G1010 CDSM STANSON: HCPCS | Performed by: INTERNAL MEDICINE

## 2024-11-15 PROCEDURE — 81001 URINALYSIS AUTO W/SCOPE: CPT | Performed by: PHYSICIAN ASSISTANT

## 2024-11-15 PROCEDURE — 99233 SBSQ HOSP IP/OBS HIGH 50: CPT | Performed by: INTERNAL MEDICINE

## 2024-11-15 PROCEDURE — 250N000011 HC RX IP 250 OP 636: Performed by: INTERNAL MEDICINE

## 2024-11-15 PROCEDURE — 120N000001 HC R&B MED SURG/OB

## 2024-11-15 PROCEDURE — 36415 COLL VENOUS BLD VENIPUNCTURE: CPT | Performed by: INTERNAL MEDICINE

## 2024-11-15 PROCEDURE — 250N000013 HC RX MED GY IP 250 OP 250 PS 637: Performed by: INTERNAL MEDICINE

## 2024-11-15 PROCEDURE — 75572 CT HRT W/3D IMAGE: CPT | Mod: MG

## 2024-11-15 PROCEDURE — 87086 URINE CULTURE/COLONY COUNT: CPT | Performed by: PHYSICIAN ASSISTANT

## 2024-11-15 PROCEDURE — 75572 CT HRT W/3D IMAGE: CPT | Mod: 26 | Performed by: INTERNAL MEDICINE

## 2024-11-15 PROCEDURE — G1010 CDSM STANSON: HCPCS

## 2024-11-15 PROCEDURE — 99221 1ST HOSP IP/OBS SF/LOW 40: CPT | Mod: FS | Performed by: STUDENT IN AN ORGANIZED HEALTH CARE EDUCATION/TRAINING PROGRAM

## 2024-11-15 PROCEDURE — 84132 ASSAY OF SERUM POTASSIUM: CPT | Performed by: INTERNAL MEDICINE

## 2024-11-15 PROCEDURE — 250N000011 HC RX IP 250 OP 636: Performed by: NURSE PRACTITIONER

## 2024-11-15 RX ORDER — VERAPAMIL HYDROCHLORIDE 100 MG/1
100 CAPSULE, EXTENDED RELEASE ORAL AT BEDTIME
Status: DISCONTINUED | OUTPATIENT
Start: 2024-11-15 | End: 2024-11-16 | Stop reason: HOSPADM

## 2024-11-15 RX ORDER — IOPAMIDOL 755 MG/ML
500 INJECTION, SOLUTION INTRAVASCULAR ONCE
Status: COMPLETED | OUTPATIENT
Start: 2024-11-15 | End: 2024-11-15

## 2024-11-15 RX ORDER — CEFTRIAXONE 1 G/1
1 INJECTION, POWDER, FOR SOLUTION INTRAMUSCULAR; INTRAVENOUS EVERY 24 HOURS
Status: DISCONTINUED | OUTPATIENT
Start: 2024-11-15 | End: 2024-11-16

## 2024-11-15 RX ORDER — VERAPAMIL HYDROCHLORIDE 120 MG/1
120 TABLET, FILM COATED, EXTENDED RELEASE ORAL DAILY
Status: DISCONTINUED | OUTPATIENT
Start: 2024-11-15 | End: 2024-11-15

## 2024-11-15 RX ADMIN — CLOPIDOGREL BISULFATE 75 MG: 75 TABLET ORAL at 09:02

## 2024-11-15 RX ADMIN — VERAPAMIL HYDROCHLORIDE 100 MG: 100 CAPSULE, EXTENDED RELEASE ORAL at 23:54

## 2024-11-15 RX ADMIN — IOPAMIDOL 100 ML: 755 INJECTION, SOLUTION INTRAVENOUS at 13:42

## 2024-11-15 RX ADMIN — CEFTRIAXONE SODIUM 1 G: 1 INJECTION, POWDER, FOR SOLUTION INTRAMUSCULAR; INTRAVENOUS at 14:53

## 2024-11-15 RX ADMIN — VERAPAMIL HYDROCHLORIDE 100 MG: 100 CAPSULE, EXTENDED RELEASE ORAL at 19:59

## 2024-11-15 RX ADMIN — ASPIRIN 81 MG: 81 TABLET, COATED ORAL at 09:02

## 2024-11-15 NOTE — PLAN OF CARE
Pt here with LUE numbness and weakness    A/Ox4. Intact ex for some LUE fingertip tingling. VSS RA. IND in room. NSR. Continent. K recheck in AM. Needing MRI and CTA chest/abd/pelvis. Discharging pending workup. Likely back to home.

## 2024-11-15 NOTE — CONSULTS
[facility] Consultation by [facility]    Alicia Leonard MRN# [id number]   Age: 74 year old YOB: 1950     Date of Admission:  11/13/2024    Reason for consult: Renal calculus       Requesting CADE/MD: Hospitalist       Level of consult: Consult, one time to make recs           Assessment and Plan:   Assessment:   (I63.411) Cerebrovascular accident (CVA) due to embolism of right middle cerebral artery (H)  (primary encounter diagnosis)  (R29.898) LUE weakness  Right staghorn calculus.   Probable UTI      Plan:   -CT reviewed with her. Large right renal stone that should be treated in the coming weeks or months.  Other medical issues take priority now.    -UC ordered. Could consider starting antibiotics. Follow-up C&S Consider 14 day PO course once resulted.   -No urologic intervention planned during the hospitalization. Will arrange follow-up with Dr. Hernandez to discuss PCNL in the coming weeks/months. Given CVA would need medical clearance to undergo general anesthetic.     Discussed with RN and Dr. Kwan.    Mine Villalta PA-C  [facility]  Office: [phone]  After business hours: [phone]               Chief Complaint:   Kidney stone     History is obtained from the patient and EMR.         History of Present Illness:     This patient is a 74 year old female admitted with CVA.    CT noted a large staghorn calculus from the right lower renal pole into the mid right renal pelvis measuring 3.2 cm. Mild proximal right ureteral dilatation and urothelial wall thickening with periureteral edema. No obstructing distal ureteral calculi.    No urinary symptoms or flank pain. No gross hematuria. Neuro and Onc following other medical issues.      UA nitrite neg, large leuk est, >182 WBCs/HPF, WBC clumps, WBC 4.5, Afeb.            Past Medical History:     Past Medical History:   Diagnosis Date    Benign essential hypertension     Cancer (H) 2017    Cervical cancer (H)     LVH (left  ventricular hypertrophy)     Pituitary microadenoma (H)     Urge incontinence of urine              Past Surgical History:     No past surgical history on file.          Social History:     Social History     Tobacco Use    Smoking status: Never    Smokeless tobacco: Never   Substance Use Topics    Alcohol use: Never             Family History:     Family History   Problem Relation Age of Onset    Cancer Sister     Diabetes Sister      Family history reviewed and updated in EPIC and reviewed            Allergies:     No Known Allergies          Medications:     Current Facility-Administered Medications   Medication Dose Route Frequency Provider Last Rate Last Admin    acetaminophen (TYLENOL) tablet 650 mg  650 mg Oral Q4H PRN Kirk Agosto MD        Or    acetaminophen (TYLENOL) Suppository 650 mg  650 mg Rectal Q4H PRN Kirk Agosto MD        aspirin EC tablet 81 mg  81 mg Oral Daily Kirk Agosto MD   81 mg at 11/15/24 0902    Or    aspirin (ASA) chewable tablet 81 mg  81 mg Oral or NG Tube Daily Kirk Agosto MD   81 mg at 11/14/24 0839    benzocaine-menthol (CHLORASEPTIC) 6-10 MG lozenge 1 lozenge  1 lozenge Buccal Q1H PRN Kirk Agosto MD        clopidogrel (PLAVIX) tablet 75 mg  75 mg Oral or NG Tube Daily Kirk Agosto MD   75 mg at 11/15/24 0902    labetalol (NORMODYNE/TRANDATE) injection 10-20 mg  10-20 mg Intravenous Q10 Min PRN Kirk Agosto MD        Or    hydrALAZINE (APRESOLINE) injection 10-20 mg  10-20 mg Intravenous Q1H PRN Kirk Agosto MD        lidocaine (LMX4) cream   Topical Q1H PRN Kirk Agosto MD        lidocaine 1 % 0.1-1 mL  0.1-1 mL Other Q1H PRN Kirk Agosto MD        melatonin tablet 1 mg  1 mg Oral At Bedtime PRN Kirk Agosto MD        ondansetron (ZOFRAN ODT) ODT tab 4 mg  4 mg Oral Q6H PRN Kirk Agosto MD        Or    ondansetron (ZOFRAN) injection 4 mg  4 mg Intravenous Q6H  Kirk Sousa MD        polyethylene glycol (MIRALAX) Packet 17 g  17 g Oral BID PRKirk Mena MD        prochlorperazine (COMPAZINE) injection 5 mg  5 mg Intravenous Q6H PRKirk Mena MD        Or    prochlorperazine (COMPAZINE) tablet 5 mg  5 mg Oral Q6H Kirk Sousa MD        senna-docusate (SENOKOT-S/PERICOLACE) 8.6-50 MG per tablet 1 tablet  1 tablet Oral BID Kirk Sousa MD        Or    senna-docusate (SENOKOT-S/PERICOLACE) 8.6-50 MG per tablet 2 tablet  2 tablet Oral BID Kirk Sousa MD        sodium chloride (PF) 0.9% PF flush 3 mL  3 mL Intracatheter Q8H Kirk Agosto MD   3 mL at 11/15/24 0902    sodium chloride (PF) 0.9% PF flush 3 mL  3 mL Intracatheter q1 min Kirk Sousa MD        verapamil ER (CALAN-SR) CR tablet 120 mg  120 mg Oral Daily Nika Zuniga MD   120 mg at 11/15/24 1108             Review of Systems:   A comprehensive review of systems was performed and found to be negative except as described in the HPI.    BP (!) 163/83 (BP Location: Right arm)   Pulse 86   Temp 98.5  F (36.9  C) (Oral)   Resp 16   Wt 78.7 kg (173 lb 6.4 oz)   SpO2 97%   BMI 27.99 kg/m    PSYCH: NAD  EYES: EOMI  MOUTH: MMM  NEURO: AAO x3           Data:     Lab Results   Component Value Date    WBC 4.5 11/14/2024    HGB 10.9 (L) 11/14/2024    HCT 33.1 (L) 11/14/2024    MCV 91 11/14/2024     11/14/2024     Lab Results   Component Value Date    CR 0.90 11/13/2024         Results for orders placed or performed during the hospital encounter of 11/13/24   CT Head w/o Contrast    Narrative    EXAM: CT HEAD W/O CONTRAST  LOCATION: [facility]  DATE: 11/13/2024    INDICATION: Code Stroke to evaluate for potential thrombolysis and thrombectomy. PLEASE READ IMMEDIATELY. Left arm weakness.  COMPARISON: None.  TECHNIQUE: Routine CT Head without IV contrast. Multiplanar reformats. Dose  reduction techniques were used.    FINDINGS:  INTRACRANIAL CONTENTS: No intracranial hemorrhage, extraaxial collection, or mass effect.  No CT evidence of acute infarct. Normal parenchymal attenuation. Normal ventricles and sulci.     VISUALIZED ORBITS/SINUSES/MASTOIDS: No intraorbital abnormality. No paranasal sinus mucosal disease. Postoperative changes left mastoidectomy.    BONES/SOFT TISSUES: No acute abnormality.      Impression    IMPRESSION:  1.  No acute intracranial injury, hemorrhage, mass, or CT evidence of recent ischemia.   CTA Head Neck with Contrast    Narrative    HEAD AND NECK CT ANGIOGRAM WITH IV CONTRAST  [facility]  11/13/2024 9:40 PM CST    INDICATION: Code Stroke to evaluate for potential thrombolysis and thrombectomy. PLEASE READ IMMEDIATELY. Left arm weakness.  TECHNIQUE: Head and neck CT angiogram with IV contrast. CT images of the head and neck vessels obtained during the arterial phase of intravenous contrast administration. Axial helical 2D reconstructed images and multiplanar 3D MIP reconstructed images of   the head and neck vessels were performed by the technologist. Dose reduction techniques were used. Vessel stenoses reported according to NASCET criteria.  CONTRAST: 67mL Isovue 370  COMPARISON: None.     FINDINGS:  HEAD CTA: The intracranial circulation is patent without evidence for aneurysm, proximal vessel occlusion, high-grade intracranial stenosis or arteriovenous malformation.  Patent dural venous sinuses.    NECK CTA: Two vessel (bovine) arch.  Carotid arteries are patent with mild atherosclerotic change.  No hemodynamically significant stenosis by NASCET criteria in either carotid system.  Patent vertebral arteries. No dissection. Bilateral axillary   lymphadenopathy is incompletely imaged.      Impression    CONCLUSION:  HEAD CTA:  1.  Negative. No vessel stenosis, occlusion or aneurysm.    NECK CTA:  1.  Mild carotid artery atherosclerosis. No  dissection or hemodynamically significant narrowing in the neck by NASCET criteria.  2.  Bilateral axillary lymphadenopathy is incompletely imaged.    Findings were discussed with Dr. JASE CHANEY via telephone at 2157 hours on 11/13/2024.   MR Brain w/o & w Contrast    Narrative    EXAM: MR BRAIN W/O and W CONTRAST  LOCATION: [facility]  DATE: 11/14/2024    INDICATION: Acute ischemic stroke  COMPARISON:  CT head and neck 11/13/2024.  CONTRAST: 8 mL Gadavist  TECHNIQUE: Routine multiplanar multisequence head MRI without and with intravenous contrast.    FINDINGS:  INTRACRANIAL CONTENTS:  There are a few punctate acute/subacute right posterior border zone territory infarcts involving the parietal and occipital lobes. No mass, acute hemorrhage, or extra-axial fluid collections. Scattered nonspecific T2/FLAIR   hyperintensities within the cerebral white matter most consistent with mild chronic microvascular ischemic change including small areas of subacute infarct in the right paracentral lobule with enhancement. Mild generalized cerebral atrophy. No   hydrocephalus. Normal position of the cerebellar tonsils. No pathologic contrast enhancement.    SELLA: No abnormality accounting for technique.    OSSEOUS STRUCTURES/SOFT TISSUES: Normal marrow signal. The major intracranial vascular flow voids are maintained.     ORBITS: No abnormality accounting for technique.     SINUSES/MASTOIDS: No paranasal sinus mucosal disease. No middle ear or mastoid effusion.       Impression    IMPRESSION:  1.  There are a few punctate acute/subacute right posterior border zone infarcts involving the parietal and occipital lobes.  2.  Small subacute infarcts in the right paracentral lobule. This would account for the patient's reported left arm weakness.  3.  No mass effect or hemorrhage.   CT Chest/Abdomen/Pelvis w Contrast    Narrative    EXAM: CT CHEST/ABDOMEN/PELVIS W CONTRAST  LOCATION: [facility]  [facility]  DATE: 11/15/2024    INDICATION: Hypertensive at 2:30 at home, 190s systolic; questionable pheochromocytoma with history of pituitary microadenoma and 10 mm thyroid nodule.  COMPARISON: None.  TECHNIQUE: CT scan of the chest, abdomen, and pelvis was performed following injection of IV contrast. Multiplanar reformats were obtained. Dose reduction techniques were used.   CONTRAST: 86 mL Isovue-370.    FINDINGS:   LUNGS AND PLEURA: No pulmonary infiltrate or pleural fluid. Minimal bibasilar atelectasis and subpleural fibrotic changes in the bases. Bilateral pulmonary nodules with the largest in the anterior aspect of the left lower lobe having a mean diameter 5   mm. 4 mm mean diameter pulmonary nodule right middle lobe.    MEDIASTINUM/AXILLAE: Bilateral axillary lymphadenopathy. Largest inferior right axillary lymph node measuring 2.3 x 1.9 cm. Largest left axillary lymph node measuring 2.3 x 1.6 cm. 1 cm nodule within the isthmus of the thyroid. Bilateral hilar   lymphadenopathy with the largest right hilar lymph node measuring 1 x 1.7 cm at station 10R. No pericardial fluid.    CORONARY ARTERY CALCIFICATION: None.    HEPATOBILIARY: No calcified gallstones or biliary dilatation. Hepatic cysts, no follow-up.    PANCREAS: Normal.    SPLEEN: Normal.    ADRENAL GLANDS: Normal.    KIDNEYS/BLADDER: Symmetric renal enhancement. Minimal-to-moderate right renal pelvic dilatation with peripelvic edema. Large staghorn calculus from the right lower renal pole into the mid right renal pelvis measuring 3.2 cm. Mild proximal right ureteral   dilatation and urothelial wall thickening with periureteral edema. No obstructing distal ureteral calculi. Contrast material within the bladder.    BOWEL: No obstruction or inflammatory change. Colonic diverticulosis. Appendix unremarkable.    LYMPH NODES: No lymphadenopathy.    VASCULATURE: Normal.    PELVIC ORGANS: 3.2 cm calcified uterine fibroid posteriorly. No free  fluid.    MUSCULOSKELETAL: Moderate degenerative changes both hips. Marked lower lumbar facet arthropathy. Multilevel minimal-to-moderate degenerative changes in the spine.      Impression    IMPRESSION:  1.  Minimal-to-moderate right renal pelvic dilatation with peripelvic edema. Large staghorn calculus involving the right lower renal pole extending into the mid right renal pelvis measuring 3.2 cm in maximal dimension. Mild proximal right ureteral   dilatation urothelial wall thickening and periureteral edema. Recommend correlation with UA for underlying infection or hematuria. Consider follow-up Urology consultation.    2.  Bilateral pulmonary nodules with the largest having a mean diameter of 5 mm.    3.  Bilateral axillary lymphadenopathy with mild bilateral hilar lymphadenopathy. Findings can be seen with lymphoma/ lymphoproliferative disorders or other etiologies including sarcoidosis or infection. Consider further evaluation with tissue diagnosis,   Hematology/Oncology consultation, and/or PET/CT.    4.  Uterine fibroid.    REFERENCE:  Guidelines for Management of Incidental Pulmonary Nodules Detected on CT Images: From the Fleischner Society 2017.   Guidelines apply to incidental nodules in patients who are 35 years or older.  Guidelines do not apply to lung cancer screening, patients with immunosuppression, or patients with known primary cancer.    MULTIPLE NODULES  Nodule size <6 mm  Low-risk patients: No follow-up needed.  High-risk patients: Optional follow-up at 12 months.   Echocardiogram Complete - For age > 60 yrs     Value    LVEF  >70%    Narrative    [id number]  [id number]  [id number]  [id number]^GILBERTO^[facility]  Echocardiography Laboratory  [address] [place] [address]     Name: SHARON ROEGL  MRN: [id number]  : 1950  Study Date: 2024 12:39 PM  Age: 74 yrs  Gender: Female  Patient Location: [facility]  Reason For Study: Cerebrovascular  Incident  Ordering Physician: MARCOS MACIAS  Referring Physician: Torres Raza  Performed By: Olga Hugo RDCS     BSA: 1.9 m2  Height: 66 in  Weight: 179 lb  HR: 86  BP: 192/89 mmHg  ______________________________________________________________________________  Procedure  Complete Portable Echo Adult.  ______________________________________________________________________________  Interpretation Summary     1. Hyperdynamic LV. The visual ejection fraction is >70%.  2. No segmental wall motion abnormalities noted.  3. No hemodynamically significant valvular disease.  No prior study for comparison.  ______________________________________________________________________________  Left Ventricle  The left ventricle is normal in size. There is normal left ventricular wall  thickness. Grade I or early diastolic dysfunction. Hyperdynamic LV with mild  mid intracavitory gradient (14 mmHg). No LVOT obstruction. The visual ejection  fraction is >70%. No regional wall motion abnormalities noted.     Right Ventricle  The right ventricle is normal in size and function.     Atria  Normal left atrial size. Right atrial size is normal.     Mitral Valve  The mitral valve leaflets appear normal. There is no evidence of stenosis,  fluttering, or prolapse. There is trace mitral regurgitation.     Tricuspid Valve  Normal tricuspid valve. There is trace tricuspid regurgitation.     Aortic Valve  There is mild trileaflet aortic sclerosis. There is trace aortic  regurgitation.     Pulmonic Valve  The pulmonic valve is not well visualized.     Vessels  Normal size aorta. IVC diameter <2.1 cm collapsing >50% with sniff suggests a  normal RA pressure of 3 mmHg.     Pericardium  There is no pericardial effusion.     Rhythm  Sinus rhythm was noted.  ______________________________________________________________________________  MMode/2D Measurements & Calculations  IVSd: 1.1 cm     LVIDd: 3.8 cm  LVIDs: 2.0 cm  LVPWd: 0.95 cm  FS:  46.2 %  LV mass(C)d: 119.2 grams  LV mass(C)dI: 62.5 grams/m2  Ao root diam: 3.1 cm  LA dimension: 3.6 cm  asc Aorta Diam: 3.1 cm  LA/Ao: 1.1  LVOT diam: 1.8 cm  LVOT area: 2.6 cm2  Ao root diam index Ht(cm/m): 1.9  Ao root diam index BSA (cm/m2): 1.6  Asc Ao diam index BSA (cm/m2): 1.6  Asc Ao diam index Ht(cm/m): 1.8  LA Volume (BP): 41.4 ml     LA Volume Index (BP): 21.7 ml/m2  RWT: 0.50     Doppler Measurements & Calculations  MV E max dashawn: 88.5 cm/sec  MV A max dashawn: 110.0 cm/sec  MV E/A: 0.80  MV dec time: 0.25 sec  Ao V2 max: 204.0 cm/sec  Ao max P.0 mmHg  Ao V2 mean: 138.0 cm/sec  Ao mean P.0 mmHg  Ao V2 VTI: 36.6 cm  PA acc time: 0.13 sec  E/E' av.4  Lateral E/e': 11.5  Medial E/e': 17.3  RV S Dashawn: 12.8 cm/sec     ______________________________________________________________________________  Report approved by: Christophe Cazares 2024 01:46 PM

## 2024-11-15 NOTE — PROGRESS NOTES
"[facility]    Medicine Progress Note - Hospitalist Service    Date of Admission:  11/13/2024    Assessment & Plan   Alicia Leonard is a 74 year old female with past medical history significant for hypertension who presents with left arm weakness. Admitted on 11/13/2024.     Left arm weakness with dysmetria due to acute/subacute punctate right posterior border zone ischemic strokes  Small subacute right paracentral lobule ischemic strokes, ESUS  Hypertensive emergency   *Presents with acute onset left arm weakness, numbness and coordination difficulty at around 1945 11/13. SBP as high as 230 after episode per family   *Head CT no acute abnormality.   *CTA head negative.  *CTA neck mild carotid artery atherosclerosis.   *Symptoms nearly resolved with persistent numbness in finger tips.   Stroke Neurology consult requested.  I appreciate Michaelle Wilson's follow up  Brain MRI shows, \"a few punctate acute/subacute right posterior border zone infarcts involving the parietal and occipital lobes. Small subacute infarcts in the right paracentral lobule. This would account for the patient's reported left arm weakness.\"  Echocardiogram shows hyperdynamic LV, EF greater than 70%, no regional wall motion abnormalities, no hemodynamically significant valvular disease  HgbA1c is 5.8%  lipid panel as follows (total/HDL/LDL/triglycerides): 136/46/67/113  PT/OT/SLP, they recommend home with assistance from family  Continue aspirin 81 mg daily indefinitely  Continue Plavix 75 mg daily x 21 days  Zio patch at discharge  Nongated cardiac CTA to evaluate for cardiac thrombus, ordered by stroke neurology  Okay to resume home BP meds, per stroke neuro  Also per them, \"defer statin therapy given strokes appear embolic and LDL within goal.    Mild troponin elevation  LVH  Initial troponin 18. Possible secondary to TIA/CVA versus hypertensive emergency in setting of LVH   Trend troponin  Cardiac " "monitoring  Echocardiogram as above    Large staghorn calculus, right kidney, causing pyelonephritis  Stroke neurology ordered CTAP to complete stroke workup  Study shows a large, staghorn calculus involving the right kidney, with urothelial wall thickening and periureteral edema but no obstruction.  Urology consult requested, I appreciate Dr. Kwan and Mine Villalta's evaluation and recommendation  Per them, \"She does have a staghorn calculus in the right kidney along with pyelonephritis. UA picture is consistent with the same. From our perspective I think she needs to be started on IV antibiotics based on the findings on the CT and urine culture needs to be followed up. We would recommend total antibiotic treatment for at least 2 weeks. Follow-up with urology clinic to discuss kidney stone management.\"  Discussed with urology, add ceftriaxone, follow-up urine culture result    Bilateral hilar adenopathy, bilateral axillary lymphadenopathy  The patient says she had a's chest CT during her recent hospitalization at [place], they also identified axillary adenopathy  She plans to follow-up with them as outpatient and says there is a plan to biopsy a lymph node  Will not request oncology consult here since she has established follow-up at [place]    Chronic normocytic anemia  Hgb 10.0 g/dl. Stable from 9/6/2024 (iSTAT on day of surgery)  Monitor CBC     Otorrhea CSF leak s/p mastoidectomy, repair and fat graft 9/6/24  Encephalocoele   Pituitary microadenoma  Follows at [facility]   Continue outpatient follow-up    Axillary lymphadenopathy  Noted on CT neck on admission. Has been seen at [place] for this with plan for US guided biopsy, does not appear to have been completed per chart review  Continue outpatient follow-up         Diet: Regular Diet Adult  Diet    DVT Prophylaxis: Pneumatic Compression Devices  Fall Catheter: Not present  Lines: None     Cardiac Monitoring: ACTIVE order. Indication: Stroke, acute (48 hours)  Code " "Status: Full Code      Clinically Significant Risk Factors                                       Disposition Plan     Medically Ready for Discharge: Anticipated in 2-4 Days      Nika Zuniga MD  Hospitalist Service  [facility]  Securely message with [employer] (more info)  Text page via [employer] Paging/Directory   ______________________________________________________________________    Interval History   \"My left hand feels a little numb, but otherwise, I feel fine.\"  Patient denies headache, no respiratory or GI complaints.  We discussed the findings from her CTAB, including finding of kidney stone.  She stresses that she has never had any kidney problems, although kidney disease runs in the family and her mother was on dialysis for 21 years.    Physical Exam   Vital Signs: Temp: 98.5  F (36.9  C) Temp src: Oral BP: (!) 163/83 Pulse: 86   Resp: 16 SpO2: 97 % O2 Device: None (Room air)    Weight: 173 lbs 6.4 oz    Constitutional: Awake, alert  Respiratory: Clear to auscultation bilaterally, no crackles or wheezing  Cardiovascular: Regular rate and rhythm, normal S1 and S2, and no murmur noted  GI: Normal bowel sounds, soft, non-distended, non-tender  Skin/Integumen: No rash on exposed skin.  No lower extremity edema  Other: Mood is calm      Medical Decision Making       50 MINUTES SPENT BY ME on the date of service doing chart review, history, exam, documentation & further activities per the note.  Including discussion with patient, her daughter, also stroke neurology and urology    Data     I have personally reviewed the following data over the past 24 hrs:    N/A  \   N/A   / N/A     N/A N/A N/A /  90   3.4 N/A N/A \       Imaging results reviewed over the past 24 hrs:   Recent Results (from the past 24 hours)   MR Brain w/o & w Contrast    Narrative    EXAM: MR BRAIN W/O and W CONTRAST  LOCATION: [facility]  DATE: 11/14/2024    INDICATION: Acute ischemic " stroke  COMPARISON:  CT head and neck 11/13/2024.  CONTRAST: 8 mL Gadavist  TECHNIQUE: Routine multiplanar multisequence head MRI without and with intravenous contrast.    FINDINGS:  INTRACRANIAL CONTENTS:  There are a few punctate acute/subacute right posterior border zone territory infarcts involving the parietal and occipital lobes. No mass, acute hemorrhage, or extra-axial fluid collections. Scattered nonspecific T2/FLAIR   hyperintensities within the cerebral white matter most consistent with mild chronic microvascular ischemic change including small areas of subacute infarct in the right paracentral lobule with enhancement. Mild generalized cerebral atrophy. No   hydrocephalus. Normal position of the cerebellar tonsils. No pathologic contrast enhancement.    SELLA: No abnormality accounting for technique.    OSSEOUS STRUCTURES/SOFT TISSUES: Normal marrow signal. The major intracranial vascular flow voids are maintained.     ORBITS: No abnormality accounting for technique.     SINUSES/MASTOIDS: No paranasal sinus mucosal disease. No middle ear or mastoid effusion.       Impression    IMPRESSION:  1.  There are a few punctate acute/subacute right posterior border zone infarcts involving the parietal and occipital lobes.  2.  Small subacute infarcts in the right paracentral lobule. This would account for the patient's reported left arm weakness.  3.  No mass effect or hemorrhage.   CT Chest/Abdomen/Pelvis w Contrast    Narrative    EXAM: CT CHEST/ABDOMEN/PELVIS W CONTRAST  LOCATION: [facility]  DATE: 11/15/2024    INDICATION: Hypertensive at 2:30 at home, 190s systolic; questionable pheochromocytoma with history of pituitary microadenoma and 10 mm thyroid nodule.  COMPARISON: None.  TECHNIQUE: CT scan of the chest, abdomen, and pelvis was performed following injection of IV contrast. Multiplanar reformats were obtained. Dose reduction techniques were used.   CONTRAST: 86 mL  Isovue-370.    FINDINGS:   LUNGS AND PLEURA: No pulmonary infiltrate or pleural fluid. Minimal bibasilar atelectasis and subpleural fibrotic changes in the bases. Bilateral pulmonary nodules with the largest in the anterior aspect of the left lower lobe having a mean diameter 5   mm. 4 mm mean diameter pulmonary nodule right middle lobe.    MEDIASTINUM/AXILLAE: Bilateral axillary lymphadenopathy. Largest inferior right axillary lymph node measuring 2.3 x 1.9 cm. Largest left axillary lymph node measuring 2.3 x 1.6 cm. 1 cm nodule within the isthmus of the thyroid. Bilateral hilar   lymphadenopathy with the largest right hilar lymph node measuring 1 x 1.7 cm at station 10R. No pericardial fluid.    CORONARY ARTERY CALCIFICATION: None.    HEPATOBILIARY: No calcified gallstones or biliary dilatation. Hepatic cysts, no follow-up.    PANCREAS: Normal.    SPLEEN: Normal.    ADRENAL GLANDS: Normal.    KIDNEYS/BLADDER: Symmetric renal enhancement. Minimal-to-moderate right renal pelvic dilatation with peripelvic edema. Large staghorn calculus from the right lower renal pole into the mid right renal pelvis measuring 3.2 cm. Mild proximal right ureteral   dilatation and urothelial wall thickening with periureteral edema. No obstructing distal ureteral calculi. Contrast material within the bladder.    BOWEL: No obstruction or inflammatory change. Colonic diverticulosis. Appendix unremarkable.    LYMPH NODES: No lymphadenopathy.    VASCULATURE: Normal.    PELVIC ORGANS: 3.2 cm calcified uterine fibroid posteriorly. No free fluid.    MUSCULOSKELETAL: Moderate degenerative changes both hips. Marked lower lumbar facet arthropathy. Multilevel minimal-to-moderate degenerative changes in the spine.      Impression    IMPRESSION:  1.  Minimal-to-moderate right renal pelvic dilatation with peripelvic edema. Large staghorn calculus involving the right lower renal pole extending into the mid right renal pelvis measuring 3.2 cm in maximal  dimension. Mild proximal right ureteral   dilatation urothelial wall thickening and periureteral edema. Recommend correlation with UA for underlying infection or hematuria. Consider follow-up Urology consultation.    2.  Bilateral pulmonary nodules with the largest having a mean diameter of 5 mm.    3.  Bilateral axillary lymphadenopathy with mild bilateral hilar lymphadenopathy. Findings can be seen with lymphoma/ lymphoproliferative disorders or other etiologies including sarcoidosis or infection. Consider further evaluation with tissue diagnosis,   Hematology/Oncology consultation, and/or PET/CT.    4.  Uterine fibroid.    REFERENCE:  Guidelines for Management of Incidental Pulmonary Nodules Detected on CT Images: From the Fleischner Society 2017.   Guidelines apply to incidental nodules in patients who are 35 years or older.  Guidelines do not apply to lung cancer screening, patients with immunosuppression, or patients with known primary cancer.    MULTIPLE NODULES  Nodule size <6 mm  Low-risk patients: No follow-up needed.  High-risk patients: Optional follow-up at 12 months.

## 2024-11-15 NOTE — PLAN OF CARE
VSS on RA. Tele NSR. A&O x4. Neuros intact except tingling in left fingers. Tingling improving per patient. Denies pain. Up independently in room. MRI and CT of abdomen completed this shift. Discharge plan pending.

## 2024-11-15 NOTE — PROGRESS NOTES
[facility]    Vascular Neurology Progress Note    Interval History     SBPs 135-192, HR , afebrile     Hospital Course     Chief complaint: Stroke Symptoms    Alicia Leonard is a 75 y/o woman with PMHx significant for HTN, cervical cancer (remission), pituitary microadenoma, anemia, cardiomegaly, CSF leak s/p left mastoidectomy w/ encephalocele repair and abdominal fat pad graft 9/6/24, thyroid nodule, LVH. She was not on aspirin or statin therapy PTA. She presented to the ED 11/13 with LUE weakness and high blood pressure (SBP 230s). Symptoms were rapidly improving so TNK was deferred.     Assessment and Plan     #acute/subacute punctate right posterior borderzone ischemic strokes, small subacute right paracentral lobule ischemic strokes, ESUS   - aspirin 81 mg daily indefinitely   - plavix 75 mg daily x 21 days    - neuro checks q4 hours   - telemetry  - ziopatch, ordered  - non gated cardiac CTA to evaluate for cardiac thrombus, ordered   - long term BP goal 130/80, okay to restart home BP meds   - defer statin therapy given strokes appear embolic and LDL within goal   - Mediterranean diet recommended   - PT, OT  - normothermia, euglycemia, normonatremia  - stroke education   - sleep clinic referral for + sleep apnea screen, ordered   - A1c 5.8, goal < 7, PCP follow up for pre-DM  - potential LIBREXIA (milvexian vs placebo for recurrent stroke risk reduction) candidate, research team aware     #bilateral pulmonary nodules   #bilateral axillary lymphadenopathy with mild bilateral hilar lymphadenopathy  - she has been seen at [facility] for lymphadenopathy and they are planning to do lymph node biopsy; she will follow up with them   - follow up in pulm clinic for incidental nodules     #right renal staghorn calculus   - urology consulted     DVT Prophylaxis: SCDs, okay for pharmacologic ppx     Patient Follow-up    - in the next 1-2 week(s) with PCP  - in 6-8 weeks with general  "neurology or stroke CADE ([phone]), ordered     We will continue to follow.       Michaelle Wilson NP  Vascular Neurology    To page me or covering stroke neurology team member, click here: [facility]  Choose \"On Call\" tab at top, then select \"NEUROLOGY/ALL SITES\" from middle drop-down box, press Enter, then look for \"stroke\" or \"telestroke\" for your site.    Physical Examination     Temp: 98.5  F (36.9  C) Temp src: Oral BP: (!) 163/83 Pulse: 86   Resp: 16 SpO2: 97 % O2 Device: None (Room air)      Neurologic  Mental Status:  alert, oriented x 3, follows commands, speech clear and fluent, naming and repetition normal  Cranial Nerves:  visual fields intact, PERRL, EOMI with normal smooth pursuit, facial sensation intact and symmetric, facial movements symmetric, hearing not formally tested but intact to conversation, no dysarthria, shoulder shrug strong bilaterally, tongue protrusion midline  Motor:  normal muscle tone and bulk, no abnormal movements, able to move all limbs spontaneously, strength 5/5 throughout upper and lower extremities, no pronator drift  Sensory:  light touch sensation intact throughout upper and lower extremities but mildly reduced in left hand, no extinction on double simultaneous stimulation   Coordination:  normal finger-to-nose and heel-to-shin bilaterally without dysmetria    Stroke Scales       NIHSS  1a. Level of Consciousness 0-->Alert, keenly responsive   1b. LOC Questions 0-->Answers both questions correctly   1c. LOC Commands 0-->Performs both tasks correctly   2.   Best Gaze 0-->Normal   3.   Visual 0-->No visual loss   4.   Facial Palsy 0-->Normal symmetrical movements   5a. Motor Arm, Left 0-->No drift, limb holds 90 (or 45) degrees for full 10 secs   5b. Motor Arm, Right 0-->No drift, limb holds 90 (or 45) degrees for full 10 secs   6a. Motor Leg, Left 0-->No drift, leg holds 30 degree position for full 5 secs   6b. Motor Leg, right 0-->No drift, leg holds 30 degree position for " full 5 secs   7.   Limb Ataxia 0-->Absent   8.   Sensory 1-->Mild-to-moderate sensory loss, patient feels pinprick is less sharp or is dull on the affected side, or there is a loss of superficial pain with pinprick, but patient is aware of being touched   9.   Best Language 0-->No aphasia, normal   10. Dysarthria 0-->Normal   11. Extinction and Inattention  0-->No abnormality   Total 1 (11/15/24 1357)       Imaging/Labs   (Bolded imaging and labs new and/or personally reviewed or re-reviewed by me today)    MRI/Head CT Acute/subacute punctate right posterior borderzone infarcts involving parietal and occipital lobes, small subacute right paracentral lobule infarcts    Intracranial Vasculature No LVO or high grade stenosis    Cervical Vasculature Mild bilateral carotid atherosclerosis     Echocardiogram EF > 70%, no regional WMA, normal atrial sizes    EKG/Telemetry Sinus rhythm   Minimal voltage criteria for LVH, may be normal variant (Sokolow-Gaston)   T wave abnormality, consider inferolateral ischemia    Misc CT C/A/P:   1.  Minimal-to-moderate right renal pelvic dilatation with peripelvic edema. Large staghorn calculus involving the right lower renal pole extending into the mid right renal pelvis measuring 3.2 cm in maximal dimension. Mild proximal right ureteral dilatation urothelial wall thickening and periureteral edema. Recommend correlation with UA for underlying infection or hematuria. Consider follow-up Urology consultation.     2.  Bilateral pulmonary nodules with the largest having a mean diameter of 5 mm.     3.  Bilateral axillary lymphadenopathy with mild bilateral hilar lymphadenopathy. Findings can be seen with lymphoma/ lymphoproliferative disorders or other etiologies including sarcoidosis or infection. Consider further evaluation with tissue diagnosis, Hematology/Oncology consultation, and/or PET/CT.     4.  Uterine fibroid.     LDL  11/13/2024: 67 mg/dL   A1C  11/13/2024: 5.8 %   Troponin  11/14/2024: 23 ng/L     Other labs reviewed by me today:  UA, potassium     Time Spent on this Encounter   Billing: I have personally spent a total of 45 minutes providing care today, time spent in reviewing medical records and devising the plan as recorded above.

## 2024-11-16 ENCOUNTER — ORDERS ONLY (AUTO-RELEASED) (OUTPATIENT)
Dept: MEDSURG UNIT | Facility: CLINIC | Age: 74
End: 2024-11-16
Payer: MEDICARE

## 2024-11-16 VITALS
SYSTOLIC BLOOD PRESSURE: 149 MMHG | TEMPERATURE: 97.8 F | HEART RATE: 101 BPM | DIASTOLIC BLOOD PRESSURE: 103 MMHG | WEIGHT: 173.4 LBS | RESPIRATION RATE: 16 BRPM | BODY MASS INDEX: 27.99 KG/M2 | OXYGEN SATURATION: 98 %

## 2024-11-16 DIAGNOSIS — I63.411 CEREBROVASCULAR ACCIDENT (CVA) DUE TO EMBOLISM OF RIGHT MIDDLE CEREBRAL ARTERY (H): ICD-10-CM

## 2024-11-16 PROBLEM — I10 BENIGN ESSENTIAL HYPERTENSION: Status: ACTIVE | Noted: 2024-11-16

## 2024-11-16 LAB
BACTERIA UR CULT: NORMAL
POTASSIUM SERPL-SCNC: 3.9 MMOL/L (ref 3.4–5.3)

## 2024-11-16 PROCEDURE — 250N000013 HC RX MED GY IP 250 OP 250 PS 637: Performed by: INTERNAL MEDICINE

## 2024-11-16 PROCEDURE — 36415 COLL VENOUS BLD VENIPUNCTURE: CPT | Performed by: INTERNAL MEDICINE

## 2024-11-16 PROCEDURE — 84132 ASSAY OF SERUM POTASSIUM: CPT | Performed by: INTERNAL MEDICINE

## 2024-11-16 PROCEDURE — 99232 SBSQ HOSP IP/OBS MODERATE 35: CPT | Performed by: NURSE PRACTITIONER

## 2024-11-16 PROCEDURE — 99239 HOSP IP/OBS DSCHRG MGMT >30: CPT | Performed by: INTERNAL MEDICINE

## 2024-11-16 RX ORDER — CLOPIDOGREL BISULFATE 75 MG/1
75 TABLET ORAL DAILY
Qty: 18 TABLET | Refills: 0 | Status: SHIPPED | OUTPATIENT
Start: 2024-11-17 | End: 2024-12-05

## 2024-11-16 RX ORDER — ASPIRIN 81 MG/1
81 TABLET ORAL DAILY
Qty: 30 TABLET | Refills: 0 | Status: SHIPPED | OUTPATIENT
Start: 2024-11-17

## 2024-11-16 RX ORDER — VERAPAMIL HYDROCHLORIDE 120 MG/1
120 CAPSULE, EXTENDED RELEASE ORAL DAILY
Qty: 90 CAPSULE | Refills: 3 | Status: SHIPPED | OUTPATIENT
Start: 2024-11-16

## 2024-11-16 RX ADMIN — ASPIRIN 81 MG: 81 TABLET, COATED ORAL at 09:40

## 2024-11-16 RX ADMIN — CLOPIDOGREL BISULFATE 75 MG: 75 TABLET ORAL at 09:40

## 2024-11-16 ASSESSMENT — ACTIVITIES OF DAILY LIVING (ADL)
ADLS_ACUITY_SCORE: 0
ADLS_ACUITY_SCORE: 0
DEPENDENT_IADLS:: INDEPENDENT
ADLS_ACUITY_SCORE: 0

## 2024-11-16 NOTE — PLAN OF CARE
Goal Outcome Evaluation:      Plan of Care Reviewed With: patient          Outcome Evaluation: Home    KATI CERNA  [facility]  INPATIENT CARE COORDINATION

## 2024-11-16 NOTE — PROGRESS NOTES
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 She was not on aspirin or statin therapy PTA. She presented to the ED 11/13 with LUE weakness and high blood pressure (SBP 230s). Symptoms were rapidly improving so TNK was deferred.     Assessment and Plan     #acute/subacute punctate right posterior borderzone ischemic strokes, small subacute right paracentral lobule ischemic strokes, ESUS (embolic stroke of undetermined source), residual left hand numbness   - aspirin 81 mg daily indefinitely   - plavix 75 mg daily x 21 days    - neuro checks q4 hours   - telemetry  - ziopatch, ordered  - long term BP goal 130/80, okay to restart home BP meds    - pt advised to check BP BID and bring BP log to PCP follow up  - defer statin therapy given strokes appear embolic and LDL within goal   - Mediterranean diet recommended   - PT, OT  - normothermia, euglycemia, normonatremia  - stroke education   - sleep clinic referral for + sleep apnea screen, ordered   - A1c 5.8, goal < 7, PCP follow up for pre-DM  - LIBREXIA (milvexian vs placebo for recurrent stroke risk reduction) candidate, pt ultimately declined   - okay for pt to discharge from a vascular neurology perspective      #bilateral pulmonary nodules   #bilateral axillary lymphadenopathy with mild bilateral hilar lymphadenopathy  - she has been seen at [facility] for lymphadenopathy and they are planning to do lymph node biopsy; she will follow up with them   - follow up in pulm clinic for incidental nodules      #right renal staghorn  "calculus   - urology consulted; outpatient follow up    DVT Prophylaxis: SCDs, okay for pharmacologic ppx while hospitalized     Patient Follow-up    - in the next 1-2 week(s) with PCP  - in 6-8 weeks with general neurology or stroke CADE ([phone]), ordered   - at [place] as planned re: lymphadenopathy  - with PCP vs pulm clinic for incidental nodules   - with urology as planned     No further stroke evaluation is recommended, so we will sign off. Please contact us with any additional questions.      Michaelle Wilson NP  Vascular Neurology    To page me or covering stroke neurology team member, click here: [facility]  Choose \"On Call\" tab at top, then select \"NEUROLOGY/ALL SITES\" from middle drop-down box, press Enter, then look for \"stroke\" or \"telestroke\" for your site.    Physical Examination     Temp: 98.6  F (37  C) Temp src: Oral BP: 115/61 Pulse: 86   Resp: 24 SpO2: 95 % O2 Device: None (Room air)      Neurologic  Mental Status:  alert, oriented x 3, follows commands, speech clear and fluent, naming and repetition normal  Cranial Nerves:  visual fields intact, PERRL, EOMI with normal smooth pursuit, facial sensation intact and symmetric, facial movements symmetric, hearing not formally tested but intact to conversation, palate elevation symmetric and uvula midline, no dysarthria, shoulder shrug strong bilaterally, tongue protrusion midline  Motor:  normal muscle tone and bulk, no abnormal movements, able to move all limbs spontaneously, strength 5/5 throughout upper and lower extremities, no pronator drift  Sensory:  light touch sensation intact throughout upper and lower extremities but mildly reduced in left hand , no extinction on double simultaneous stimulation   Coordination:  normal finger-to-nose and heel-to-shin bilaterally without dysmetria  Station/Gait:  normal width, turn, arm swing    Stroke Scales       NIHSS  1a. Level of Consciousness 0-->Alert, keenly responsive   1b. LOC Questions 0-->Answers " both questions correctly   1c. LOC Commands 0-->Performs both tasks correctly   2.   Best Gaze 0-->Normal   3.   Visual 0-->No visual loss   4.   Facial Palsy 0-->Normal symmetrical movements   5a. Motor Arm, Left 0-->No drift, limb holds 90 (or 45) degrees for full 10 secs   5b. Motor Arm, Right 0-->No drift, limb holds 90 (or 45) degrees for full 10 secs   6a. Motor Leg, Left 0-->No drift, leg holds 30 degree position for full 5 secs   6b. Motor Leg, right 0-->No drift, leg holds 30 degree position for full 5 secs   7.   Limb Ataxia 0-->Absent   8.   Sensory 1-->Mild-to-moderate sensory loss, patient feels pinprick is less sharp or is dull on the affected side, or there is a loss of superficial pain with pinprick, but patient is aware of being touched   9.   Best Language 0-->No aphasia, normal   10. Dysarthria 0-->Normal   11. Extinction and Inattention  0-->No abnormality   Total 1 (11/16/24 4997)       Imaging/Labs   (Bolded imaging and labs new and/or personally reviewed or re-reviewed by me today)      MRI/Head CT Acute/subacute punctate right posterior borderzone infarcts involving parietal and occipital lobes, small subacute right paracentral lobule infarcts    Intracranial Vasculature No LVO or high grade stenosis    Cervical Vasculature Mild bilateral carotid atherosclerosis      Echocardiogram EF > 70%, no regional WMA, normal atrial sizes    EKG/Telemetry Sinus rhythm   Minimal voltage criteria for LVH, may be normal variant (Sokolow-Gaston)   T wave abnormality, consider inferolateral ischemia    Misc CT C/A/P:   1.  Minimal-to-moderate right renal pelvic dilatation with peripelvic edema. Large staghorn calculus involving the right lower renal pole extending into the mid right renal pelvis measuring 3.2 cm in maximal dimension. Mild proximal right ureteral dilatation urothelial wall thickening and periureteral edema. Recommend correlation with UA for underlying infection or hematuria. Consider follow-up  Urology consultation.  2.  Bilateral pulmonary nodules with the largest having a mean diameter of 5 mm.  3.  Bilateral axillary lymphadenopathy with mild bilateral hilar lymphadenopathy. Findings can be seen with lymphoma/ lymphoproliferative disorders or other etiologies including sarcoidosis or infection. Consider further evaluation with tissue diagnosis, Hematology/Oncology consultation, and/or PET/CT.  4.  Uterine fibroid.    CT heart structure:   1. Normal pulmonary venous anatomy with all pulmonary veins draining  into the left atrium.  2  No thrombus is detected in the left atrial appendage. .  3.  There is no left ventricular mass or thrombus.   4.   The visualized aortic root, ascending aorta and descending  thoracic aorta are normal.  5.   Please review Radiology report for incidental noncardiac findings  that  will follow separately.  6. Concentric left ventricular hypertrophy is noted.. Coronary  calcification in left anterior descending is seen. There is no  pericardial effusion      LDL  11/13/2024: 67 mg/dL   A1C  11/13/2024: 5.8 %   Troponin 11/14/2024: 23 ng/L        Time Spent on this Encounter   Billing: I have personally spent a total of 35 minutes providing care today, time spent in reviewing medical records and devising the plan as recorded above.

## 2024-11-16 NOTE — PLAN OF CARE
Reason for Admission: LUE weakness, high BP. Subacute R ischemic stroke    Cognitive/Mentation: A/Ox 4  Neuros/CMS: Intact ex tingling in LUE fingertips  VS: stable on RA. SBP <220.   Tele: NSR.  /GI: Continent.    Pulmonary: LS clear.  Pain: denies.     Drains/Lines: PIV SL  Activity: IND.  Diet: regular with thin liquids. Takes pills whole.     Discharge: pending. Anticipated tomorrow    Aggression Stoplight Tool: green    End of shift summary: pt had UA collected, showed possible UTI. ABX started. CTA angiogram heart completed.

## 2024-11-16 NOTE — DISCHARGE SUMMARY
[facility]  Hospitalist Discharge Summary      Date of Admission:  11/13/2024  Date of Discharge:  11/16/2024  Discharging Provider: Nika Zuniga MD  Discharge Service: Hospitalist Service    Discharge Diagnoses   Left arm weakness with dysmetria due to acute/subacute punctate right posterior border zone ischemic strokes  Small subacute right paracentral lobule ischemic strokes, ESUS  Hypertensive emergency   Mild troponin elevation  LVH  Large staghorn calculus, right kidney  Bilateral hilar adenopathy, bilateral axillary lymphadenopathy   Chronic normocytic anemia  Otorrhea due to CSF leak, S/P mastoidectomy, repair and fat graft 9/6/2024  Encephalocele  Pituitary macroadenoma    Clinically Significant Risk Factors          Follow-ups Needed After Discharge   Follow-up Appointments       Follow-up and recommended labs and tests       Follow up with primary care provider, Torres Raza, within 7 days to evaluate medication change and for hospital follow- up.  No follow up labs or test are needed. Please review home blood pressure readings and advise any changes.            Follow up with Urology (Dr. Kwan, et al) as instructed by them    Unresulted Labs Ordered in the Past 30 Days of this Admission       No orders found from 10/14/2024 to 11/14/2024.            Discharge Disposition   Discharged to home  Condition at discharge: Stable    Hospital Course   Alicia Leonard is a 74 year old female with past medical history significant for hypertension who presents with left arm weakness. Admitted on 11/13/2024.     Left arm weakness with dysmetria due to acute/subacute punctate right posterior border zone ischemic strokes  Small subacute right paracentral lobule ischemic strokes, ESUS  Hypertensive emergency   *Presents with acute onset left arm weakness, numbness and coordination difficulty at around 1945 11/13. SBP as high as 230 after episode per family   *Head CT no acute  "abnormality.   *CTA head negative.  *CTA neck mild carotid artery atherosclerosis.   *Symptoms nearly resolved with persistent numbness in finger tips.   Stroke Neurology consult requested.  I appreciate Michaelle Wilson's follow up  Brain MRI shows, \"a few punctate acute/subacute right posterior border zone infarcts involving the parietal and occipital lobes. Small subacute infarcts in the right paracentral lobule. This would account for the patient's reported left arm weakness.\"  Echocardiogram shows hyperdynamic LV, EF greater than 70%, no regional wall motion abnormalities, no hemodynamically significant valvular disease  HgbA1c is 5.8%  lipid panel as follows (total/HDL/LDL/triglycerides): 136/46/67/113  PT/OT/SLP, they recommend home with assistance from family  Continue aspirin 81 mg daily indefinitely  Continue Plavix 75 mg daily x 21 days  Zio patch at discharge  Nongated cardiac CTA to evaluate for cardiac thrombus, ordered by stroke neurology, is negative  Okay to resume home BP meds, per stroke neuro  Also per them, \"defer statin therapy given strokes appear embolic and LDL within goal.\"    Mild troponin elevation  LVH  Initial troponin 18. Possible secondary to TIA/CVA versus hypertensive emergency in setting of LVH   Echocardiogram as above    Large staghorn calculus, right kidney  Stroke neurology ordered CTAP to complete stroke workup  Study shows a large, staghorn calculus involving the right kidney, with urothelial wall thickening and periureteral edema but no obstruction.  Urology consult requested, I appreciate Dr. Kwan and Mine Villalta's evaluation and recommendation  Per them:  CT reviewed with her. Large right renal stone that should be treated in the coming weeks or months.  Other medical issues take priority now.    UC ordered. Could consider starting antibiotics. Follow-up C&S Consider 14 day PO course once resulted.   No urologic intervention planned during the hospitalization. Will arrange " "follow-up with Dr. Hernandez to discuss PCNL in the coming weeks/months. Given CVA would need medical clearance to undergo general anesthetic.\"   Urine culture has 10,000-50,000 CFU/mL Mixture of Urogenital Isidra.  She has no symptoms of infection and the stone was an incidental finding on CT done for stroke workup.  Discontinued antibiotics.    Bilateral hilar adenopathy, bilateral axillary lymphadenopathy  The patient says she had a's chest CT during her recent hospitalization at [facility], they also identified axillary adenopathy  She plans to follow-up with them as outpatient and says there is a plan to biopsy a lymph node  Will not request oncology consult here since she has established follow-up at [facility]    Chronic normocytic anemia  Hgb 10.0 g/dl. Stable from 9/6/2024 (iSTAT on day of surgery)  Monitor CBC     Otorrhea CSF leak s/p mastoidectomy, repair and fat graft 9/6/24  Encephalocoele   Pituitary microadenoma  Follows at [facility]   Continue outpatient follow-up        Consultations This Hospital Stay   NEUROLOGY IP STROKE CONSULT  SPEECH LANGUAGE PATH ADULT IP CONSULT  PHARMACY IP CONSULT  PHARMACY IP CONSULT  PHARMACY IP CONSULT  PHYSICAL THERAPY ADULT IP CONSULT  OCCUPATIONAL THERAPY ADULT IP CONSULT  REHAB ADMISSIONS LIAISON IP CONSULT  CARE MANAGEMENT / SOCIAL WORK IP CONSULT  UROLOGY IP CONSULT  SMOKING CESSATION PROGRAM IP CONSULT    Code Status   Full Code    Time Spent on this Encounter   I, Nika Zuniga MD, personally saw the patient today and spent greater than 30 minutes discharging this patient.       Nika Zuniga MD  [facility] NEUROSCIENCE UNIT  [address] [place] [place] [address]  Phone: [phone]  ______________________________________________________________________    Physical Exam   Vital Signs: Temp: 98.6  F (37  C) Temp src: Oral BP: 115/61 Pulse: 86   Resp: 24 SpO2: 95 % O2 Device: None (Room air)    Weight: 173 lbs 6.4 oz  I saw and examined the patient on " the date of discharge.         Primary Care Physician   Torres Raza    Discharge Orders      Adult Sleep Eval & Management Referral      Reason for your hospital stay    You had transient numbness in your left arm.     Follow-up and recommended labs and tests     Follow up with primary care provider, Torres Raza, within 7 days to evaluate medication change and for hospital follow- up.  No follow up labs or test are needed. Please review home blood pressure readings and advise any changes.     Activity    Your activity upon discharge: activity as tolerated     Diet    Follow this diet upon discharge: Current Diet:Orders Placed This Encounter      Regular Diet Adult     Stroke Hospital Follow Up (for neurologist use only)    [employer] will call you to coordinate care as prescribed by your provider. If you don t hear from a representative within 2 business days, please call [phone].       [facility]       Significant Results and Procedures   Most Recent 3 CBC's:  Recent Labs   Lab Test 11/14/24  0838 11/13/24  2144   WBC 4.5 5.4   HGB 10.9* 10.0*   MCV 91 93    211     Most Recent 3 BMP's:  Recent Labs   Lab Test 11/16/24  0729 11/15/24  0721 11/14/24  1718 11/14/24  1300 11/14/24  0739 11/13/24  2144   NA  --   --   --   --   --  137   POTASSIUM 3.9 3.4  --   --   --  3.4   CHLORIDE  --   --   --   --   --  104   CO2  --   --   --   --   --  23   BUN  --   --   --   --   --  13.0   CR  --   --   --   --   --  0.90   ANIONGAP  --   --   --   --   --  10   AUBRIE  --   --   --   --   --  8.5*   GLC  --   --  90 116* 78 95     7-Day Micro Results       Collected Updated Procedure Result Status      11/15/2024 0956 11/16/2024 0818 Urine Culture [[id number]]   Urine, Clean Catch    Final result Component Value   Culture 10,000-50,000 CFU/mL Mixture of Urogenital Isidra                   ,   Results for orders placed or performed during the hospital encounter of 11/13/24   CT Head w/o  Contrast    Narrative    EXAM: CT HEAD W/O CONTRAST  LOCATION: [facility]  DATE: 11/13/2024    INDICATION: Code Stroke to evaluate for potential thrombolysis and thrombectomy. PLEASE READ IMMEDIATELY. Left arm weakness.  COMPARISON: None.  TECHNIQUE: Routine CT Head without IV contrast. Multiplanar reformats. Dose reduction techniques were used.    FINDINGS:  INTRACRANIAL CONTENTS: No intracranial hemorrhage, extraaxial collection, or mass effect.  No CT evidence of acute infarct. Normal parenchymal attenuation. Normal ventricles and sulci.     VISUALIZED ORBITS/SINUSES/MASTOIDS: No intraorbital abnormality. No paranasal sinus mucosal disease. Postoperative changes left mastoidectomy.    BONES/SOFT TISSUES: No acute abnormality.      Impression    IMPRESSION:  1.  No acute intracranial injury, hemorrhage, mass, or CT evidence of recent ischemia.   CTA Head Neck with Contrast    Narrative    HEAD AND NECK CT ANGIOGRAM WITH IV CONTRAST  [facility]  11/13/2024 9:40 PM CST    INDICATION: Code Stroke to evaluate for potential thrombolysis and thrombectomy. PLEASE READ IMMEDIATELY. Left arm weakness.  TECHNIQUE: Head and neck CT angiogram with IV contrast. CT images of the head and neck vessels obtained during the arterial phase of intravenous contrast administration. Axial helical 2D reconstructed images and multiplanar 3D MIP reconstructed images of   the head and neck vessels were performed by the technologist. Dose reduction techniques were used. Vessel stenoses reported according to NASCET criteria.  CONTRAST: 67mL Isovue 370  COMPARISON: None.     FINDINGS:  HEAD CTA: The intracranial circulation is patent without evidence for aneurysm, proximal vessel occlusion, high-grade intracranial stenosis or arteriovenous malformation.  Patent dural venous sinuses.    NECK CTA: Two vessel (bovine) arch.  Carotid arteries are patent with mild atherosclerotic change.  No  hemodynamically significant stenosis by NASCET criteria in either carotid system.  Patent vertebral arteries. No dissection. Bilateral axillary   lymphadenopathy is incompletely imaged.      Impression    CONCLUSION:  HEAD CTA:  1.  Negative. No vessel stenosis, occlusion or aneurysm.    NECK CTA:  1.  Mild carotid artery atherosclerosis. No dissection or hemodynamically significant narrowing in the neck by NASCET criteria.  2.  Bilateral axillary lymphadenopathy is incompletely imaged.    Findings were discussed with Dr. JASE CHANEY via telephone at 2157 hours on 11/13/2024.   MR Brain w/o & w Contrast    Narrative    EXAM: MR BRAIN W/O and W CONTRAST  LOCATION: [facility]  DATE: 11/14/2024    INDICATION: Acute ischemic stroke  COMPARISON:  CT head and neck 11/13/2024.  CONTRAST: 8 mL Gadavist  TECHNIQUE: Routine multiplanar multisequence head MRI without and with intravenous contrast.    FINDINGS:  INTRACRANIAL CONTENTS:  There are a few punctate acute/subacute right posterior border zone territory infarcts involving the parietal and occipital lobes. No mass, acute hemorrhage, or extra-axial fluid collections. Scattered nonspecific T2/FLAIR   hyperintensities within the cerebral white matter most consistent with mild chronic microvascular ischemic change including small areas of subacute infarct in the right paracentral lobule with enhancement. Mild generalized cerebral atrophy. No   hydrocephalus. Normal position of the cerebellar tonsils. No pathologic contrast enhancement.    SELLA: No abnormality accounting for technique.    OSSEOUS STRUCTURES/SOFT TISSUES: Normal marrow signal. The major intracranial vascular flow voids are maintained.     ORBITS: No abnormality accounting for technique.     SINUSES/MASTOIDS: No paranasal sinus mucosal disease. No middle ear or mastoid effusion.       Impression    IMPRESSION:  1.  There are a few punctate acute/subacute right posterior border zone  infarcts involving the parietal and occipital lobes.  2.  Small subacute infarcts in the right paracentral lobule. This would account for the patient's reported left arm weakness.  3.  No mass effect or hemorrhage.   CT Chest/Abdomen/Pelvis w Contrast    Narrative    EXAM: CT CHEST/ABDOMEN/PELVIS W CONTRAST  LOCATION: [facility]  DATE: 11/15/2024    INDICATION: Hypertensive at 2:30 at home, 190s systolic; questionable pheochromocytoma with history of pituitary microadenoma and 10 mm thyroid nodule.  COMPARISON: None.  TECHNIQUE: CT scan of the chest, abdomen, and pelvis was performed following injection of IV contrast. Multiplanar reformats were obtained. Dose reduction techniques were used.   CONTRAST: 86 mL Isovue-370.    FINDINGS:   LUNGS AND PLEURA: No pulmonary infiltrate or pleural fluid. Minimal bibasilar atelectasis and subpleural fibrotic changes in the bases. Bilateral pulmonary nodules with the largest in the anterior aspect of the left lower lobe having a mean diameter 5   mm. 4 mm mean diameter pulmonary nodule right middle lobe.    MEDIASTINUM/AXILLAE: Bilateral axillary lymphadenopathy. Largest inferior right axillary lymph node measuring 2.3 x 1.9 cm. Largest left axillary lymph node measuring 2.3 x 1.6 cm. 1 cm nodule within the isthmus of the thyroid. Bilateral hilar   lymphadenopathy with the largest right hilar lymph node measuring 1 x 1.7 cm at station 10R. No pericardial fluid.    CORONARY ARTERY CALCIFICATION: None.    HEPATOBILIARY: No calcified gallstones or biliary dilatation. Hepatic cysts, no follow-up.    PANCREAS: Normal.    SPLEEN: Normal.    ADRENAL GLANDS: Normal.    KIDNEYS/BLADDER: Symmetric renal enhancement. Minimal-to-moderate right renal pelvic dilatation with peripelvic edema. Large staghorn calculus from the right lower renal pole into the mid right renal pelvis measuring 3.2 cm. Mild proximal right ureteral   dilatation and urothelial wall  thickening with periureteral edema. No obstructing distal ureteral calculi. Contrast material within the bladder.    BOWEL: No obstruction or inflammatory change. Colonic diverticulosis. Appendix unremarkable.    LYMPH NODES: No lymphadenopathy.    VASCULATURE: Normal.    PELVIC ORGANS: 3.2 cm calcified uterine fibroid posteriorly. No free fluid.    MUSCULOSKELETAL: Moderate degenerative changes both hips. Marked lower lumbar facet arthropathy. Multilevel minimal-to-moderate degenerative changes in the spine.      Impression    IMPRESSION:  1.  Minimal-to-moderate right renal pelvic dilatation with peripelvic edema. Large staghorn calculus involving the right lower renal pole extending into the mid right renal pelvis measuring 3.2 cm in maximal dimension. Mild proximal right ureteral   dilatation urothelial wall thickening and periureteral edema. Recommend correlation with UA for underlying infection or hematuria. Consider follow-up Urology consultation.    2.  Bilateral pulmonary nodules with the largest having a mean diameter of 5 mm.    3.  Bilateral axillary lymphadenopathy with mild bilateral hilar lymphadenopathy. Findings can be seen with lymphoma/ lymphoproliferative disorders or other etiologies including sarcoidosis or infection. Consider further evaluation with tissue diagnosis,   Hematology/Oncology consultation, and/or PET/CT.    4.  Uterine fibroid.    REFERENCE:  Guidelines for Management of Incidental Pulmonary Nodules Detected on CT Images: From the Fleischner Society 2017.   Guidelines apply to incidental nodules in patients who are 35 years or older.  Guidelines do not apply to lung cancer screening, patients with immunosuppression, or patients with known primary cancer.    MULTIPLE NODULES  Nodule size <6 mm  Low-risk patients: No follow-up needed.  High-risk patients: Optional follow-up at 12 months.   CTA  Angiogram Heart    Narrative    Procedure: CTA HEART STRUCTURE   Examination Date:  11/15/2024 1:54 PM     Indication:  Embolic source of embolus.     Clinical Information: embolic strokes, evaluate for cardiac thrombus,  non-gated study     Ordering Provider: Barb Spaulding    Overall quality of the study: Good.     PROCEDURE: ECG gated multi-slice computed tomography of the heart   with and without intravenous contrast  ( Isovue 370, 100 cc, wasted 0  cc) was  performed on a Siemens Dual Source Flash scanner without  incident. Beta-blockers were used to optimize heart rate (Metoprolol 0  mg Oral/ Metoprolol 0 mg IV). Sublingual Nitrostat 0.4 mg was given  prior to scanning. CTA was performed in the flash mode at a heart rate  of 70 bpm with 100 kVp. Images were reconstructed and analyzed on a  [employer] workstation. Scan protocol was optimized to minimize  radiation exposure. The total radiation exposure was calculated to be  127 DLP, and 1.70 mSv.    FINDINGS:      1. Normal pulmonary venous anatomy with all pulmonary veins draining  into the left atrium.    2  No thrombus is detected in the left atrial appendage. .    3.  There is no left ventricular mass or thrombus.     4.   The visualized aortic root, ascending aorta and descending  thoracic aorta are normal.    5.   Please review Radiology report for incidental noncardiac findings  that  will follow separately.    6. Concentric left ventricular hypertrophy is noted.. Coronary  calcification in left anterior descending is seen. There is no  pericardial effusion..    JOSH GAITAN MD         SYSTEM ID:  [id number]   Radiologist Consult For Cardiology    Narrative    RADIOLOGIST CONSULT FOR CARDIOLOGY 11/15/2024 1:54 PM    CLINICAL HISTORY: Embolic strokes, eval for cardiac thrombus  TECHNIQUE: Limited CT chest with IV contrast.  Technique and protocol  were ordered per the cardiology service. Dose reduction techniques  were used.     CONTRAST: 100mL Isovue-370    COMPARISON: CT chest, abdomen and pelvis 11/14/2024    FINDINGS:   This is  radiology assessment of non-cardiology findings. Please refer  to cardiology report for additional cardiac and vascular findings.    LUNGS AND PLEURA: Lungs are clear. Multiple small pulmonary nodules,  follow-up as described on recent CT. No pleural effusion.    MEDIASTINUM/AXILLAE: Mildly enlarged right hilar lymph nodes with  representative lesion measuring 1.5 cm in short axis (series 7 image  7). No pericardial effusion.    UPPER ABDOMEN: No significant finding.    MUSCULOSKELETAL: No acute bony abnormality.      Impression    IMPRESSION:   1.  Mildly enlarged right hilar lymph nodes, known axillary  lymphadenopathy is not included in the field-of-view. Consider  additional follow-up imaging versus sampling as described on recent  CT.  2.  No acute findings or significant interval change.    CORINA HAWKINS MD         SYSTEM ID:  [id number]   Echocardiogram Complete - For age > 60 yrs     Value    LVEF  >70%    Narrative    [id number]  [id number]  [id number]  [id number]^GILBERTO^MARCOS     [facility]  Echocardiography Laboratory  [address] [place] [address]     Name: SHARON ROGEL  MRN: [id number]  : 1950  Study Date: 2024 12:39 PM  Age: 74 yrs  Gender: Female  Patient Location: [facility]  Reason For Study: Cerebrovascular Incident  Ordering Physician: MARCOS MACIAS  Referring Physician: Torres Raza  Performed By: Olga Hugo RDCS     BSA: 1.9 m2  Height: 66 in  Weight: 179 lb  HR: 86  BP: 192/89 mmHg  ______________________________________________________________________________  Procedure  Complete Portable Echo Adult.  ______________________________________________________________________________  Interpretation Summary     1. Hyperdynamic LV. The visual ejection fraction is >70%.  2. No segmental wall motion abnormalities noted.  3. No hemodynamically significant valvular disease.  No prior study for  comparison.  ______________________________________________________________________________  Left Ventricle  The left ventricle is normal in size. There is normal left ventricular wall  thickness. Grade I or early diastolic dysfunction. Hyperdynamic LV with mild  mid intracavitory gradient (14 mmHg). No LVOT obstruction. The visual ejection  fraction is >70%. No regional wall motion abnormalities noted.     Right Ventricle  The right ventricle is normal in size and function.     Atria  Normal left atrial size. Right atrial size is normal.     Mitral Valve  The mitral valve leaflets appear normal. There is no evidence of stenosis,  fluttering, or prolapse. There is trace mitral regurgitation.     Tricuspid Valve  Normal tricuspid valve. There is trace tricuspid regurgitation.     Aortic Valve  There is mild trileaflet aortic sclerosis. There is trace aortic  regurgitation.     Pulmonic Valve  The pulmonic valve is not well visualized.     Vessels  Normal size aorta. IVC diameter <2.1 cm collapsing >50% with sniff suggests a  normal RA pressure of 3 mmHg.     Pericardium  There is no pericardial effusion.     Rhythm  Sinus rhythm was noted.  ______________________________________________________________________________  MMode/2D Measurements & Calculations  IVSd: 1.1 cm     LVIDd: 3.8 cm  LVIDs: 2.0 cm  LVPWd: 0.95 cm  FS: 46.2 %  LV mass(C)d: 119.2 grams  LV mass(C)dI: 62.5 grams/m2  Ao root diam: 3.1 cm  LA dimension: 3.6 cm  asc Aorta Diam: 3.1 cm  LA/Ao: 1.1  LVOT diam: 1.8 cm  LVOT area: 2.6 cm2  Ao root diam index Ht(cm/m): 1.9  Ao root diam index BSA (cm/m2): 1.6  Asc Ao diam index BSA (cm/m2): 1.6  Asc Ao diam index Ht(cm/m): 1.8  LA Volume (BP): 41.4 ml     LA Volume Index (BP): 21.7 ml/m2  RWT: 0.50     Doppler Measurements & Calculations  MV E max jerrell: 88.5 cm/sec  MV A max jerrell: 110.0 cm/sec  MV E/A: 0.80  MV dec time: 0.25 sec  Ao V2 max: 204.0 cm/sec  Ao max P.0 mmHg  Ao V2 mean: 138.0 cm/sec  Ao  mean P.0 mmHg  Ao V2 VTI: 36.6 cm  PA acc time: 0.13 sec  E/E' av.4  Lateral E/e': 11.5  Medial E/e': 17.3  RV S Dashawn: 12.8 cm/sec     ______________________________________________________________________________  Report approved by: Christophe Cazares 2024 01:46 PM             Discharge Medications   Current Discharge Medication List        START taking these medications    Details   aspirin 81 MG EC tablet Take 1 tablet (81 mg) by mouth daily.  Qty: 30 tablet, Refills: 0    Associated Diagnoses: Cerebrovascular accident (CVA) due to embolism of right middle cerebral artery (H)      clopidogrel (PLAVIX) 75 MG tablet Take 1 tablet (75 mg) by mouth or NG Tube daily for 18 days.  Qty: 18 tablet, Refills: 0    Associated Diagnoses: Cerebrovascular accident (CVA) due to embolism of right middle cerebral artery (H)           CONTINUE these medications which have NOT CHANGED    Details   verapamil ER (VERELAN) 120 MG 24 hr capsule Take 120 mg by mouth daily.           Allergies   No Known Allergies

## 2024-11-16 NOTE — PLAN OF CARE
Valentina Daily RN on 11/16/2024 at 6:30 AM    Reason for Admission: LUE weakness, high BP. R ischemic stroke  Cognitive/Mentation: A/Ox 4  Neuros/CMS: Stable w/ tingling in LUE fingertips  VS: VSS on RA.   Tele: NSR.  GI/: Continent.  Pulmonary: LS clear.  Pain: denies.   Drains/Lines: PIV SL  Skin: intact  Activity: indep  Diet: reg with thin liquids. Takes pills whole.   Therapies recs: home  Discharge: possibly today  Aggression Stoplight Tool: green

## 2024-11-16 NOTE — CONSULTS
Care Management Initial Consult    General Information  Assessment completed with: Patient, Alicia  Type of CM/SW Visit: Initial Assessment    Primary Care Provider verified and updated as needed: Yes   Readmission within the last 30 days: no previous admission in last 30 days      Reason for Consult: discharge planning  Advance Care Planning: Advance Care Planning Reviewed: no concerns identified          Communication Assessment  Patient's communication style: spoken language (English or Bilingual)    Hearing Difficulty or Deaf: no   Wear Glasses or Blind: no    Cognitive  Cognitive/Neuro/Behavioral: WDL  Level of Consciousness: alert  Arousal Level: opens eyes spontaneously  Orientation: oriented x 4     Best Language: 0 - No aphasia  Speech: clear, spontaneous    Living Environment:   People in home: child(chuck), adult  Winsome  Current living Arrangements: house      Able to return to prior arrangements: yes       Family/Social Support:  Care provided by: self  Provides care for: no one  Marital Status:   Support system: Children          Description of Support System: Supportive    Support Assessment: Adequate family and caregiver support    Current Resources:   Patient receiving home care services: No        Community Resources: None  Equipment currently used at home: none  Supplies currently used at home: None    Employment/Financial:  Employment Status: retired        Financial Concerns: none   Referral to Financial Worker: No       Does the patient's insurance plan have a 3 day qualifying hospital stay waiver?  No    Lifestyle & Psychosocial Needs:  Social Drivers of Health     Food Insecurity: Low Risk  (11/14/2024)    Food Insecurity     Within the past 12 months, did you worry that your food would run out before you got money to buy more?: No     Within the past 12 months, did the food you bought just not last and you didn t have money to get more?: No   Depression: Not at risk (1/11/2024)    PHQ-2      PHQ-2 Score: 0   Housing Stability: Low Risk  (11/14/2024)    Housing Stability     Do you have housing? : Yes     Are you worried about losing your housing?: No   Tobacco Use: Low Risk  (10/3/2024)    Received from [facility]    Patient History     Smoking Tobacco Use: Never     Smokeless Tobacco Use: Never     Passive Exposure: Never   Financial Resource Strain: Low Risk  (11/14/2024)    Financial Resource Strain     Within the past 12 months, have you or your family members you live with been unable to get utilities (heat, electricity) when it was really needed?: No   Alcohol Use: Not on file   Transportation Needs: Low Risk  (11/14/2024)    Transportation Needs     Within the past 12 months, has lack of transportation kept you from medical appointments, getting your medicines, non-medical meetings or appointments, work, or from getting things that you need?: No   Physical Activity: Insufficiently Active (1/4/2024)    Received from [facility], [facility]    Exercise Vital Sign     Days of Exercise per Week: 4 days     Minutes of Exercise per Session: 30 min   Interpersonal Safety: Low Risk  (11/14/2024)    Interpersonal Safety     Do you feel physically and emotionally safe where you currently live?: Yes     Within the past 12 months, have you been hit, slapped, kicked or otherwise physically hurt by someone?: No     Within the past 12 months, have you been humiliated or emotionally abused in other ways by your partner or ex-partner?: No   Stress: Not on file   Social Connections: Unknown (11/8/2023)    Received from [facility], [facility]    Social Connections     Frequency of Communication with Friends and Family: Not on file   Health Literacy: Not on file       Functional Status:  Prior to admission patient needed assistance:   Dependent ADLs:: Independent  Dependent IADLs:: Independent       Mental Health Status:          Chemical  Dependency Status:                Values/Beliefs:  Spiritual, Cultural Beliefs, Voodoo Practices, Values that affect care: no               Discussed  Partnership in Safe Discharge Planning  document with patient/family: No    Additional Information:  Per CM consult for discharge planning, chart was reviewed. Patient is a 74 year old female who presented to [facility] with left arm weakness. H&P reviewed, patient has medical history of hypertension.     Writer met with patient at bedside to discuss discharge planning. Patient reports that they live in a house with their daughter. Patient states she is completely independent and still drives. Patient expressed no needs but did want information about exercises and things that she can do to help with her arm strength. Writer stated she should talk with PT about these and they can suggest things for her.  Patient stated she had no other needs at this time. Her son-in-law will come and get her for discharge.     Next Steps: No further care management intervention anticipated at this time.  Please re-consult if further needs arise.  Care management signing off.       KATI CERNA  [facility]  INPATIENT CARE COORDINATION

## 2024-11-16 NOTE — PLAN OF CARE
Reason for Admission: LUE weakness, high BP. Subacute R ischemic stroke     Cognitive/Mentation: A/Ox 4  Neuros/CMS: Intact ex tingling in LUE fingertips  VS: stable on RA. SBP <220.   Tele: NSR.  /GI: Continent.    Pulmonary: LS clear.  Pain: denies.      Activity: IND.  Diet: regular with thin liquids. Takes pills whole.      Discharge: home today with family transporting. All discharge instructions and education provided. Questions encouraged and answered.

## 2024-11-18 ENCOUNTER — VIRTUAL VISIT (OUTPATIENT)
Dept: SLEEP MEDICINE | Facility: CLINIC | Age: 74
End: 2024-11-18
Payer: COMMERCIAL

## 2024-11-18 VITALS — WEIGHT: 173 LBS | HEIGHT: 67 IN | BODY MASS INDEX: 27.15 KG/M2

## 2024-11-18 DIAGNOSIS — I10 BENIGN ESSENTIAL HYPERTENSION: ICD-10-CM

## 2024-11-18 DIAGNOSIS — R06.83 SNORING: ICD-10-CM

## 2024-11-18 DIAGNOSIS — I63.411 CEREBROVASCULAR ACCIDENT (CVA) DUE TO EMBOLISM OF RIGHT MIDDLE CEREBRAL ARTERY (H): Primary | ICD-10-CM

## 2024-11-18 PROCEDURE — 99417 PROLNG OP E/M EACH 15 MIN: CPT | Mod: 95 | Performed by: PHYSICIAN ASSISTANT

## 2024-11-18 PROCEDURE — 99245 OFF/OP CONSLTJ NEW/EST HI 55: CPT | Mod: 95 | Performed by: PHYSICIAN ASSISTANT

## 2024-11-18 ASSESSMENT — SLEEP AND FATIGUE QUESTIONNAIRES
HOW LIKELY ARE YOU TO NOD OFF OR FALL ASLEEP WHILE WATCHING TV: WOULD NEVER DOZE
HOW LIKELY ARE YOU TO NOD OFF OR FALL ASLEEP WHILE LYING DOWN TO REST IN THE AFTERNOON WHEN CIRCUMSTANCES PERMIT: MODERATE CHANCE OF DOZING
HOW LIKELY ARE YOU TO NOD OFF OR FALL ASLEEP WHILE SITTING AND READING: SLIGHT CHANCE OF DOZING
HOW LIKELY ARE YOU TO NOD OFF OR FALL ASLEEP WHILE SITTING INACTIVE IN A PUBLIC PLACE: WOULD NEVER DOZE
HOW LIKELY ARE YOU TO NOD OFF OR FALL ASLEEP WHILE SITTING AND TALKING TO SOMEONE: WOULD NEVER DOZE
HOW LIKELY ARE YOU TO NOD OFF OR FALL ASLEEP WHEN YOU ARE A PASSENGER IN A CAR FOR AN HOUR WITHOUT A BREAK: SLIGHT CHANCE OF DOZING
HOW LIKELY ARE YOU TO NOD OFF OR FALL ASLEEP IN A CAR, WHILE STOPPED FOR A FEW MINUTES IN TRAFFIC: WOULD NEVER DOZE
HOW LIKELY ARE YOU TO NOD OFF OR FALL ASLEEP WHILE SITTING QUIETLY AFTER LUNCH WITHOUT ALCOHOL: WOULD NEVER DOZE

## 2024-11-18 ASSESSMENT — PAIN SCALES - GENERAL: PAINLEVEL_OUTOF10: NO PAIN (0)
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
## 2024-11-18 NOTE — PATIENT INSTRUCTIONS
"          MY TREATMENT INFORMATION FOR SLEEP APNEA-  Alicia Leonard    DOCTOR : Bennett Goltz, PA-C    Am I having a sleep study at a sleep center?  --->Due to normal delays, you will be contacted within 2-4 weeks to schedule    Am I having a home sleep study?  --->Watch the video for the device you are using:    -/drop off device-   https://www.Gramco.com/watch?v=yGGFBdELGhk    Frequently asked questions:  1. What is Obstructive Sleep Apnea (LILLIANA)? LILLIANA is the most common type of sleep apnea. Apnea means, \"without breath.\"  Apnea is most often caused by narrowing or collapse of the upper airway as muscles relax during sleep.   Almost everyone has occasional apneas. Most people with sleep apnea have had brief interruptions at night frequently for many years.  The severity of sleep apnea is related to how frequent and severe the events are.   2. What are the consequences of LILLIANA? Symptoms include: feeling sleepy during the day, snoring loudly, gasping or stopping of breathing, trouble sleeping, and occasionally morning headaches or heartburn at night.  Sleepiness can be serious and even increase the risk of falling asleep while driving. Other health consequences may include development of high blood pressure and other cardiovascular disease in persons who are susceptible. Untreated LILLIANA  can contribute to heart disease, stroke and diabetes.   3. What are the treatment options? In most situations, sleep apnea is a lifelong disease that must be managed with daily therapy. Medications are not effective for sleep apnea and surgery is generally not considered until other therapies have been tried. Your treatment is your choice. Continuous Positive Airway (CPAP) works right away and is the therapy that is effective in nearly everyone. An oral device to hold your jaw forward is usually the next most reliable option. Other options include postioning devices (to keep you off your back), weight loss, and surgery including " a tongue pacing device. There is more detail about some of these options below.  4. Are my sleep studies covered by insurance? Although we will request verification of coverage, we advise you also check in advance of the study to ensure there is coverage.    Important tips for those choosing CPAP and similar devices  REMEMBER-IF YOU RECEIVE A CALL FROM  [phone]-->IT IS TO SETUP A DEVICE  For new devices, sign up for device SCOTTY to monitor your device for your followup visits  We encourage you to utilize the [employer] scotty or website ( https://BayRu/ ) to monitor your therapy progress and share the data with your healthcare team when you discuss your sleep apnea.                                                    Know your equipment:  CPAP is continuous positive airway pressure that prevents obstructive sleep apnea by keeping the throat from collapsing while you are sleeping. In most cases, the device is  smart  and can slowly self-adjusts if your throat collapses and keeps a record every day of how well you are treated-this information is available to you and your care team.  BPAP is bilevel positive airway pressure that keeps your throat open and also assists each breath with a pressure boost to maintain adequate breathing.  Special kinds of BPAP are used in patients who have inadequate breathing from lung or heart disease. In most cases, the device is  smart  and can slowly self-adjusts to assist breathing. Like CPAP, the device keeps a record of how well you are treated.  Your mask is your connection to the device. You get to choose what feels most comfortable and the staff will help to make sure if fits. Here: are some examples of the different masks that are available: Magnetic mask aids may assist with use but there are safety issues that should be addressed when considering with magnets* ( see end of discussion).       Key points to remember on your journey with sleep apnea:  Sleep study.   PAP devices often need to be adjusted during a sleep study to show that they are effective and adjusted right.  Good tips to remember: Try wearing just the mask during a quiet time during the day so your body adapts to wearing it. A humidifier is recommended for comfort in most cases to prevent drying of your nose and throat. Allergy medication from your provider may help you if you are having nasal congestion.  Getting settled-in. It takes more than one night for most of us to get used to wearing a mask. Try wearing just the mask during a quiet time during the day so your body adapts to wearing it. A humidifier is recommended for comfort in most cases. Our team will work with you carefully on the first day and will be in contact within 4 days and again at 2 and 4 weeks for advice and remote device adjustments. Your therapy is evaluated by the device each day.   Use it every night. The more you are able to sleep naturally for 7-8 hours, the more likely you will have good sleep and to prevent health risks or symptoms from sleep apnea. Even if you use it 4 hours it helps. Occasionally all of us are unable to use a medical therapy, in sleep apnea, it is not dangerous to miss one night.   Communicate. Call our skilled team on the number provided on the first day if your visit for problems that make it difficult to wear the device. Over 2 out of 3 patients can learn to wear the device long-term with help from our team. Remember to call our team or your sleep providers if you are unable to wear the device as we may have other solutions for those who cannot adapt to mask CPAP therapy. It is recommended that you sleep your sleep provider within the first 3 months and yearly after that if you are not having problems.   Use it for your health. We encourage use of CPAP masks during daytime quiet periods to allow your face and brain to adapt to the sensation of CPAP so that it will be a more natural sensation to awaken to at  night or during naps. This can be very useful during the first few weeks or months of adapting to CPAP though it does not help medically to wear CPAP during wakefulness and  should not be used as a strategy just to meet guidelines.  Take care of your equipment. Make sure you clean your mask and tubing using directions every day and that your filter and mask are replaced as recommended or if they are not working.     *Masks with magnets:  Updated Contraindications  Masks with magnetic components are contraindicated for use by patients where they, or anyone in close physical contact while using the mask, have the following:   Active medical implants that interact with magnets (i.e., pacemakers, implantable cardioverter defibrillators (ICD), neurostimulators, cerebrospinal fluid (CSF) shunts, insulin/infusion pumps)   Metallic implants/objects containing ferromagnetic material (i.e., aneurysm clips/flow disruption devices, embolic coils, stents, valves, electrodes, implants to restore hearing or balance with implanted magnets, ocular implants, metallic splinters in the eye)  Updated Warning  Keep the mask magnets at a safe distance of at least 6 inches (150 mm) away from implants or medical devices that may be adversely affected by magnetic interference. This warning applies to you or anyone in close physical contact with your mask. The magnets are in the frame and lower headgear clips, with a magnetic field strength of up to 400mT. When worn, they connect to secure the mask but may inadvertently detach while asleep.  Implants/medical devices, including those listed within contraindications, may be adversely affected if they change function under external magnetic fields or contain ferromagnetic materials that attract/repel to magnetic fields (some metallic implants, e.g., contact lenses with metal, dental implants, metallic cranial plates, screws, jesica hole covers, and bone substitute devices). Consult your physician  and  of your implant / other medical device for information on the potential adverse effects of magnetic fields.    BESIDES CPAP, WHAT OTHER THERAPIES ARE THERE?    Positioning Device  Positioning devices are generally used when sleep apnea is mild and only occurs on your back.This example shows a pillow that straps around the waist. It may be appropriate for those whose sleep study shows milder sleep apnea that occurs primarily when lying flat on one's back. Preliminary studies have shown benefit but effectiveness at home may need to be verified by a home sleep test. These devices are generally not covered by medical insurance.  Examples of devices that maintain sleeping on the back to prevent snoring and mild sleep apnea.    Belt type body positioner  http://yourdelivery.JumpStart/    Electronic reminder  http://nightshifttherapy.com/            Oral Appliance  What is oral appliance therapy?  An oral appliance device fits on your teeth at night like a retainer used after having braces. The device is made by a specialized dentist and requires several visits over 1-2 months before a manufactured device is made to fit your teeth and is adjusted to prevent your sleep apnea. Once an oral device is working properly, snoring should be improved. A home sleep test may be recommended at that time if to determine whether the sleep apnea is adequately treated.       Some things to remember:  -Oral devices are often, but not always, covered by your medical insurance. Be sure to check with your insurance provider.   -If you are referred for oral therapy, you will be given a list of specialized dentists to consider or you may choose to visit the Web site of the American Academy of Dental Sleep Medicine  -Oral devices are less likely to work if you have severe sleep apnea or are extremely overweight.     More detailed information  An oral appliance is a small acrylic device that fits over the upper and lower teeth  (similar to  a retainer or a mouth guard). This device slightly moves jaw forward, which moves the base of the tongue forward, opens the airway, improves breathing for effective treat snoring and obstructive sleep apnea in perhaps 7 out of 10 people .  The best working devices are custom-made by a dental device  after a mold is made of the teeth 1, 2, 3.  When is an oral appliance indicated?  Oral appliance therapy is recommended as a first-line treatment for patients with primary snoring, mild sleep apnea, and for patients with moderate sleep apnea who prefer appliance therapy to use of CPAP4, 5. Severity of sleep apnea is determined by sleep testing and is based on the number of respiratory events per hour of sleep.   How successful is oral appliance therapy?  The success rate of oral appliance therapy in patients with mild sleep apnea is 75-80% while in patients with moderate sleep apnea it is 50-70%. The chance of success in patients with severe sleep apnea is 40-50%. The research also shows that oral appliances have a beneficial effect on the cardiovascular health of LILLIANA patients at the same magnitude as CPAP therapy7.  Oral appliances should be a second-line treatment in cases of severe sleep apnea, but if not completely successful then a combination therapy utilizing CPAP plus oral appliance therapy may be effective. Oral appliances tend to be effective in a broad range of patients although studies show that the patients who have the highest success are females, younger patients, those with milder disease, and less severe obesity. 3, 6.   Finding a dentist that practices dental sleep medicine  Specific training is available through the American Academy of Dental Sleep Medicine for dentists interested in working in the field of sleep. To find a dentist who is educated in the field of sleep and the use of oral appliances, near you, visit the Web site of the American Academy of Dental Sleep  Medicine.    References  1. Jennifer et al. Objectively measured vs self-reported compliance during oral appliance therapy for sleep-disordered breathing. Chest 2013; 144(5): 9399-0600.  2. Annie et al. Objective measurement of compliance during oral appliance therapy for sleep-disordered breathing. Thorax 2013; 68(1): 91-96.  3. Fabián et al. Mandibular advancement devices in 620 men and women with LILLIANA and snoring: tolerability and predictors of treatment success. Chest 2004; 125: 8757-4860.  4. Delfino et al. Oral appliances for snoring and LILLIANA: a review. Sleep 2006; 29: 244-262.  5. Rodrigo et al. Oral appliance treatment for LILLIANA: an update. J Clin Sleep Med 2014; 10(2): 215-227.  6. Matt et al. Predictors of OSAH treatment outcome. J Dent Res 2007; 86: 9763-7723.      Weight Loss:   Your Body mass index is 27.1 kg/m .    Being overweight does not necessarily mean you will have health consequences.  Those who have BMI over 35 or over 27 with existing medical conditions carries greater risk.   Weight loss decreases severity of sleep apnea in most people with obesity. For those with mild obesity who have developed snoring with weight gain, even 15-30 pound weight loss can improve and occasionally milder eliminate sleep apnea.  Structured and life-long dietary and health habits are necessary to lose weight and keep healthier weight levels.     The Comprehensive Weight loss program offers all aspects of weight loss strategies including two Non-Surgical Weight Loss Programs: Medical Weight Management and our 24 Week Healthy Lifestyle Program:    Medical Weight Management: You will meet with a Medical Weight Management Provider, as well as a Registered Dietician. The program may include medication therapy, dietary education, recommended exercise and physical therapy programs, monthly support group meetings, and possible psychological counseling. Follow up visits with the provider or dietician are  scheduled based on your progress and needs.    24 Week Healthy Lifestyle Program: This unique program is designed to give you the support of weekly appointments and activities thru a 24-week period. It may include all of the components of the basic program (above), with the addition of 11 individual Health  Visits, 24-week access to the [employer] website for over 700 online classes, and monthly support group meetings. This program has an out-of-pocket expense of $499 to cover the items that can not be billed to insurance (health coaches and [employer] access), and is non-refundable/non-transferable (you may be able to use a Health Savings Account; ask your HSA provider). There may be an optional meal replacement plan prescribed as well.   Surgical management achieves meaningful long-term weight loss and improvement in health risks in most patients with more severe obesity.      Sleep Apnea Surgery:    Surgery for obstructive sleep apnea is considered generally only when other therapies fail to work. Surgery may be discussed with you if you are having a difficult time tolerating CPAP and or when there is an abnormal structure that requires surgical correction.  Nose and throat surgeries often enlarge the airway to prevent collapse.  Most of these surgeries create pain for 1-2 weeks and up to half of the most common surgeries are not effective throughout life.  You should carefully discuss the benefits and drawbacks to surgery with your sleep provider and surgeon to determine if it is the best solution for you.   More information  Surgery for LILLIANA is directed at areas that are responsible for narrowing or complete obstruction of the airway during sleep.  There are a wide range of procedures available to enlarge and/or stabilize the airway to prevent blockage of breathing in the three major areas where it can occur: the palate, tongue, and nasal regions.  Successful surgical treatment depends on the accurate  identification of the factors responsible for obstructive sleep apnea in each person.  A personalized approach is required because there is no single treatment that works well for everyone.  Because of anatomic variation, consultation with an examination by a sleep surgeon is a critical first step in determining what surgical options are best for each patient.  In some cases, examination during sedation may be recommended in order to guide the selection of procedures.  Patients will be counseled about risks and benefits as well as the typical recovery course after surgery. Surgery is typically not a cure for a person s LILLIANA.  However, surgery will often significantly improve one s LILLIANA severity (termed  success rate ).  Even in the absence of a cure, surgery will decrease the cardiovascular risk associated with OSA7; improve overall quality of life8 (sleepiness, functionality, sleep quality, etc).  Palate Procedures:  Patients with LILLIANA often have narrowing of their airway in the region of their tonsils and uvula.  The goals of palate procedures are to widen the airway in this region as well as to help the tissues resist collapse.  Modern palate procedure techniques focus on tissue conservation and soft tissue rearrangement, rather than tissue removal.  Often the uvula is preserved in this procedure. Residual sleep apnea is common in patient after pharyngoplasty with an average reduction in sleep apnea events of 33%2.    Tongue Procedures:  ExamWhile patients are awake, the muscles that surround the throat are active and keep this region open for breathing. These muscles relax during sleep, allowing the tongue and other structures to collapse and block breathing.  There are several different tongue procedures available.  Selection of a tongue base procedure depends on characteristics seen on physical exam.  Generally, procedures are aimed at removing bulky tissues in this area or preventing the back of the tongue from  falling back during sleep.  Success rates for tongue surgery range from 50-62%3.  Hypoglossal Nerve Stimulation:  Hypoglossal nerve stimulation has recently received approval from the [place] Food and Drug Administration for the treatment of obstructive sleep apnea.  This is based on research showing that the system was safe and effective in treating sleep apnea6.  Results showed that the median AHI score decreased 68%, from 29.3 to 9.0. This therapy uses an implant system that senses breathing patterns and delivers mild stimulation to airway muscles, which keeps the airway open during sleep.  The system consists of three fully implanted components: a small generator (similar in size to a pacemaker), a breathing sensor, and a stimulation lead.  Using a small handheld remote, a patient turns the therapy on before bed and off upon awakening.    Candidates for this device must be greater than 18 years of age, have moderate to severe obstructive sleep apnea with less than 25% central events  (AHI between 15-65), BMI less than 35, have tried CPAP/oral appliance for at least 8 weeks without success, and have appropriate upper airway anatomy (determined by a sleep endoscopy performed by Dr. Kirk Cervantes or Dr. Gino Guzman).  Nasal Procedures:  Nasal obstruction can interfere with nasal breathing during the day and night.  Studies have shown that relief of nasal obstruction can improve the ability of some patients to tolerate positive airway pressure therapy for obstructive sleep apnea1.  Treatment options include medications such as nasal saline, topical corticosteroid and antihistamine sprays, and oral medications such as antihistamines or decongestants. Non-surgical treatments can include external nasal dilators for selected patients. If these are not successful by themselves, surgery can improve the nasal airway either alone or in combination with these other options.        Combination Procedures:  Combination  of surgical procedures and other treatments may be recommended, particularly if patients have more than one area of narrowing or persistent positional disease.  The success rate of combination surgery ranges from 66-80%2,3.    References  Jerad BARLOW. The Role of the Nose in Snoring and Obstructive Sleep Apnoea: An Update.  Eur Arch Otorhinolaryngol. 2011; 268: 1365-73.   Travis SM; Silver JA; Foster JR; Pallanch JF; Henry MB; Arsenio SG; Marilu HDEZ. Surgical modifications of the upper airway for obstructive sleep apnea in adults: a systematic review and meta-analysis. SLEEP 2010;33(10):3314-1416. Jazmine ALATORRE. Hypopharyngeal surgery in obstructive sleep apnea: an evidence-based medicine review.  Arch Otolaryngol Head Neck Surg. 2006 Feb;132(2):206-13.  Miguel Angel YH1, Nestor Y, Lex DEEPTHI. The efficacy of anatomically based multilevel surgery for obstructive sleep apnea. Otolaryngol Head Neck Surg. 2003 Oct;129(4):327-35.  Jazmine ALATORRE, Goldberg A. Hypopharyngeal Surgery in Obstructive Sleep Apnea: An Evidence-Based Medicine Review. Arch Otolaryngol Head Neck Surg. 2006 Feb;132(2):206-13.  Josey PJ et al. Upper-Airway Stimulation for Obstructive Sleep Apnea.  N Engl J Med. 2014 Jan 9;370(2):139-49.  Darinel Y et al. Increased Incidence of Cardiovascular Disease in Middle-aged Men with Obstructive Sleep Apnea. Am J Respir Crit Care Med; 2002 166: 159-165  Arana EM et al. Studying Life Effects and Effectiveness of Palatopharyngoplasty (SLEEP) study: Subjective Outcomes of Isolated Uvulopalatopharyngoplasty. Otolaryngol Head Neck Surg. 2011; 144: 623-631.    WHAT IF I ONLY HAVE SNORING?  Mandibular advancement devices, lateral sleep positioning, long-term weight loss and treatment of nasal allergies have been shown to improve snoring.  Exercising tongue muscles with a game (https://apps.Razoom/us/scotty/soundly-reduce-snoring/yf2327410199) or stimulating the tongue during the day with a device  (https://doi.org/10.3390/ceo95648044) have improved snoring in some individuals.  https://www.yWorld.com/  https://www.sleepfoundation.org/best-anti-snoring-mouthpieces-and-mouthguards  Remember to Drive Safe... Drive Alive     Sleep health profoundly affects your health, mood, and your safety.  Thirty three percent of the population (one in three of us) is not getting enough sleep and many have a sleep disorder. Not getting enough sleep or having an untreated / undertreated sleep condition may make us sleepy without even knowing it. In fact, our driving could be dramatically impaired due to our sleep health. As your provider, here are some things I would like you to know about driving:     Here are some warning signs for impairment and dangerous drowsy driving:              -Having been awake more than 16 hours               -Looking tired               -Eyelid drooping              -Head nodding (it could be too late at this point)              -Driving for more than 30 minutes     Some things you could do to make the driving safer if you are experiencing some drowsiness:              -Stop driving and rest              -Call for transportation              -Make sure your sleep disorder is adequately treated     Some things that have been shown NOT to work when experiencing drowsiness while driving:              -Turning on the radio              -Opening windows              -Eating any  distracting  /  entertaining  foods (e.g., sunflower seeds, candy, or any other)              -Talking on the phone      Your decision may not only impact your life, but also the life of others. Please, remember to drive safe for yourself and all of us.     What Type Of Note Output Would You Prefer (Optional)?: Standard Output Hpi Title: Evaluation of Skin Lesions How Severe Are Your Spot(S)?: mild Have Your Spot(S) Been Treated In The Past?: has not been treated

## 2024-11-18 NOTE — NURSING NOTE
[address] [place]  [place] [facility] [address]    Is the patient currently in the state of [place]? YES    Visit mode:VIDEO    If the visit is dropped, the patient can be reconnected by:VIDEO VISIT: Text to cell phone:   Telephone Information:   Mobile [phone]       Will anyone else be joining the visit? NO  (If patient encounters technical issues they should call [phone]    Are changes needed to the allergy or medication list? Pt stated no med changes    Are refills needed on medications prescribed by this physician? NO    Rooming Documentation:  Questionnaire(s) completed    Reason for visit: Consult    Marge ISAACF

## 2024-11-20 NOTE — CONFIDENTIAL NOTE
Reason for visit: Cerebrovascular accident (CVA) due to embolism of right middle cerebral artery     Stroke Hospital Follow Up   Referring Provider:   Michaelle Wilson NP      Office Visit Notes: 11/13/2024-11/16/2024     Notes:   Provider:           IMAGING   STATUS/LOCATION   DATE/TYPE   MRI/MRA Internal  11/14/2024   CT/CTA Internal 11/13/2024   LABS Internal    EEG NA    EMG NA    NEUOROPSYCH   TEST: NA

## 2024-12-04 ENCOUNTER — OFFICE VISIT (OUTPATIENT)
Dept: NEUROLOGY | Facility: CLINIC | Age: 74
End: 2024-12-04
Attending: NURSE PRACTITIONER
Payer: COMMERCIAL

## 2024-12-04 VITALS
HEIGHT: 66 IN | DIASTOLIC BLOOD PRESSURE: 68 MMHG | OXYGEN SATURATION: 99 % | SYSTOLIC BLOOD PRESSURE: 159 MMHG | HEART RATE: 76 BPM | BODY MASS INDEX: 27.98 KG/M2 | WEIGHT: 174.1 LBS

## 2024-12-04 DIAGNOSIS — I10 BENIGN ESSENTIAL HYPERTENSION: Primary | ICD-10-CM

## 2024-12-04 DIAGNOSIS — I63.411 CEREBROVASCULAR ACCIDENT (CVA) DUE TO EMBOLISM OF RIGHT MIDDLE CEREBRAL ARTERY (H): ICD-10-CM

## 2024-12-04 RX ORDER — ASPIRIN 81 MG/1
81 TABLET ORAL DAILY
Qty: 90 TABLET | Refills: 3 | Status: SHIPPED | OUTPATIENT
Start: 2024-12-04

## 2024-12-04 NOTE — PROGRESS NOTES
Neurology Consultation    Patient Name:  Alicia Leonard  MRN:  [id number]    :  1950  Date of Service:  2024  Primary care provider:  Torres Rzaa        Chief Complaint: Stroke     History of Present Illness:     Alicia Leonard (Kingsley) is a 74 year right handed old female who presents for stroke follow-up.    She reports that she felt like she needed to use the restroom. As she was going to the bathroom, her left arm started to feel funny.  Then felt like her left arm was not doing what her brain wanted it to do and weak. Son-in law is in medicine and said this was likely a stroke.  Symptoms had almost completely resolved by the time she got to the ED.  SBPs in the 230s.      Still has a little pain or weakness with shoulder abduction on the left.  She is otherwise pretty much back to baseline.  Denies cognitive or memory concerns.     Cold sensation in the left hand digits 1st-3rd. Had this prior to stroke. Does not wake her from sleep.  Has history of carpal tunnel syndrome. Had bilateral carpal tunnel release in her 30s.      Has been checking her BP at home. Still running high mostly SBP in 150s-160s.     She completed Plavix today.  She continues ASA 81 mg.     Date of stroke: 2024  Symptoms: Left arm weakness   Location of stroke: Punctate right posterior borderzone infarcts involving parietal and occipital lobes, small subacute right paracentral lobule infarcts   Etiology of stroke: ESUS   CHADSVASC2 if patient has a fib: No atrial fibrillation seen on telemetry, outpatient cardiac monitoring pending   PFO/Watchman candidate? (If <60) NA  CEA/Carotid stenting candidate?: Mild bilateral carotid artery stenosis - no indication   Aspirin?: DAPT x 21 days, then ASA monotherapy   Hx of aspirin failure?: Not on PTA ASA or statin   Requiring anticoagulation?: No indication at this time, waiting on Zio patch/cardiac monitor   Smoker/smoking cessation? Denies  Cholesterol: LDL 67,  no indication for statin   Sleep apnea?: Avila- saw sleep clinic on 11/18 with plan for PSG  Diabetes/control: 5.8%       MRI/Head CT Acute/subacute punctate right posterior borderzone infarcts involving parietal and occipital lobes, small subacute right paracentral lobule infarcts    Intracranial Vasculature No LVO or high grade stenosis    Cervical Vasculature Mild bilateral carotid atherosclerosis      Echocardiogram EF > 70%, no regional WMA, normal atrial sizes    EKG/Telemetry Sinus rhythm   Minimal voltage criteria for LVH, may be normal variant (Sokolow-Gaston)   T wave abnormality, consider inferolateral ischemia    Misc CT C/A/P:   1.  Minimal-to-moderate right renal pelvic dilatation with peripelvic edema. Large staghorn calculus involving the right lower renal pole extending into the mid right renal pelvis measuring 3.2 cm in maximal dimension. Mild proximal right ureteral dilatation urothelial wall thickening and periureteral edema. Recommend correlation with UA for underlying infection or hematuria. Consider follow-up Urology consultation.  2.  Bilateral pulmonary nodules with the largest having a mean diameter of 5 mm.  3.  Bilateral axillary lymphadenopathy with mild bilateral hilar lymphadenopathy. Findings can be seen with lymphoma/ lymphoproliferative disorders or other etiologies including sarcoidosis or infection. Consider further evaluation with tissue diagnosis, Hematology/Oncology consultation, and/or PET/CT.  4.  Uterine fibroid.     CT heart structure:   1. Normal pulmonary venous anatomy with all pulmonary veins draining  into the left atrium.  2  No thrombus is detected in the left atrial appendage.   3.  There is no left ventricular mass or thrombus.   4.   The visualized aortic root, ascending aorta and descending thoracic aorta are normal.  6. Concentric left ventricular hypertrophy is noted.. Coronary calcification in left anterior descending is seen. There is no pericardial effusion  "     LDL  11/13/2024: 67 mg/dL   A1C  11/13/2024: 5.8 %   Troponin 11/14/2024: 23 ng/L      Saw urology for incidental finding of the large staghorn calculus. No symptoms. She has follow-up with them.     Lymphadenopathy was found at [facility] when workup was done for the CSF leak. She has follow-up with them Friday with plans for bx.    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  Lung: Respirations are non-labored.  Extremities: Normal range of motion  Integumentary: Warm and dry    Neurologic:  Mental status : alert, fund of knowledge appropriate for visit    LANGUAGE: Speech fluent, no dysarthria     CN:  II- pupils equal and reactive, visual fields full  III, IV, VI- extraocular movements intact  V- sensation intact bilaterally  VII- face symmetric  VIII- hearing intact, no nystagmus  IX, X- palate elevates symmetrically  XI- sternocleidomastoid 5/5  XII- tongue midline    MOTOR : intact bulk and tone  5/5 strength in all ext    SENSORY : Mild decrease to pp in the tips of digits 1-3 bilaterally.  Intact to temp and vib in BUE and BLE.      REFLEXES:       Right Left   Triceps 2+ 2+   Biceps 3 2+   Brachioradialis 3 2+   Knee jerk 2+ 2+   Ankle jerk 2+ 2+   Walsh absent   Toes down going     CEREBELLUM: Very mild action tremor with finger to nose. Finger taps and heel to shin intact bilaterally.     GAIT : normal ; able to do tandem gait     Cognition and Speech: Speech clear and coherent.    Psychiatric: Cooperative, Appropriate mood & affect. "     Assessment/Plan:   Alicia Leonard is a 74 year right handed old female who presents for stroke follow-up.    Punctate right posterior borderzone infarcts involving parietal and occipital lobes, small subacute right paracentral lobule infarcts   - Etiology: Suspect ESUS.  She has had extensive workup to evaluate for source of embolus.  CT heart did not show thrombus. CT c/ab/p while showing several incidental findings no evidence of malignancy. Her Zio patch is still pending (was still wearing it).  If atrial fibrillation is seen, will start DOAC and refer to cardiology.   - She completed DAPT today. Continue ASA 81 mg for secondary stroke prevention   - LDL at goal. No indication for statin.  - Mild signs of sleep apnea. She has already established with sleep clinic and getting PSG  - Encourage Mediterranean diet, low sodium and sugar consumption  - PT for very mild intermittent left shoulder abduction weakness/discomfort  - Reveiwed stroke warning signs and encouraged her to go to the ED or call 911 if she has them.    Hypertension  - Goal BP is <130/80 with tighter control associated with improved vascular outcomes. BP is still not well controlled. She will follow-up with her PCP for further recommendations.   - Recommend continued home blood pressure monitoring and keeping a BP log for primary care follow up       I spent 71 minutes providing care for this patient, including reviewing imaging, labs, and notes as well as providing counseling and coordination of care for the above issues.

## 2024-12-04 NOTE — LETTER
2024      Alicia Leonard  [address]  [place] [address]      Dear Colleague,    Thank you for referring your patient, Alicia Leonard, to the [facility]. Please see a copy of my visit note below.      Neurology Consultation    Patient Name:  Alicia Leonard  MRN:  [id number]    :  1950  Date of Service:  2024  Primary care provider:  Torres Raza        Chief Complaint: Stroke     History of Present Illness:     Alicia Leonard (Kingsley) is a 74 year right handed old female who presents for stroke follow-up.    She reports that she felt like she needed to use the restroom. As she was going to the bathroom, her left arm started to feel funny.  Then felt like her left arm was not doing what her brain wanted it to do and weak. Son-in law is in medicine and said this was likely a stroke.  Symptoms had almost completely resolved by the time she got to the ED.  SBPs in the 230s.      Still has a little pain or weakness with shoulder abduction on the left.  She is otherwise pretty much back to baseline.  Denies cognitive or memory concerns.     Cold sensation in the left hand digits 1st-3rd. Had this prior to stroke. Does not wake her from sleep.  Has history of carpal tunnel syndrome. Had bilateral carpal tunnel release in her 30s.      Has been checking her BP at home. Still running high mostly SBP in 150s-160s.     She completed Plavix today.  She continues ASA 81 mg.     Date of stroke: 2024  Symptoms: Left arm weakness   Location of stroke: Punctate right posterior borderzone infarcts involving parietal and occipital lobes, small subacute right paracentral lobule infarcts   Etiology of stroke: ESUS   CHADSVASC2 if patient has a fib: No atrial fibrillation seen on telemetry, outpatient cardiac monitoring pending   PFO/Watchman candidate? (If <60) NA  CEA/Carotid stenting candidate?: Mild bilateral carotid artery stenosis - no  indication   Aspirin?: DAPT x 21 days, then ASA monotherapy   Hx of aspirin failure?: Not on PTA ASA or statin   Requiring anticoagulation?: No indication at this time, waiting on Zio patch/cardiac monitor   Smoker/smoking cessation? Denies  Cholesterol: LDL 67, no indication for statin   Sleep apnea?: Avila- saw sleep clinic on 11/18 with plan for PSG  Diabetes/control: 5.8%       MRI/Head CT Acute/subacute punctate right posterior borderzone infarcts involving parietal and occipital lobes, small subacute right paracentral lobule infarcts    Intracranial Vasculature No LVO or high grade stenosis    Cervical Vasculature Mild bilateral carotid atherosclerosis      Echocardiogram EF > 70%, no regional WMA, normal atrial sizes    EKG/Telemetry Sinus rhythm   Minimal voltage criteria for LVH, may be normal variant (Sokolow-Gaston)   T wave abnormality, consider inferolateral ischemia    Misc CT C/A/P:   1.  Minimal-to-moderate right renal pelvic dilatation with peripelvic edema. Large staghorn calculus involving the right lower renal pole extending into the mid right renal pelvis measuring 3.2 cm in maximal dimension. Mild proximal right ureteral dilatation urothelial wall thickening and periureteral edema. Recommend correlation with UA for underlying infection or hematuria. Consider follow-up Urology consultation.  2.  Bilateral pulmonary nodules with the largest having a mean diameter of 5 mm.  3.  Bilateral axillary lymphadenopathy with mild bilateral hilar lymphadenopathy. Findings can be seen with lymphoma/ lymphoproliferative disorders or other etiologies including sarcoidosis or infection. Consider further evaluation with tissue diagnosis, Hematology/Oncology consultation, and/or PET/CT.  4.  Uterine fibroid.     CT heart structure:   1. Normal pulmonary venous anatomy with all pulmonary veins draining  into the left atrium.  2  No thrombus is detected in the left atrial appendage.   3.  There is no left  "ventricular mass or thrombus.   4.   The visualized aortic root, ascending aorta and descending thoracic aorta are normal.  6. Concentric left ventricular hypertrophy is noted.. Coronary calcification in left anterior descending is seen. There is no pericardial effusion      LDL  11/13/2024: 67 mg/dL   A1C  11/13/2024: 5.8 %   Troponin 11/14/2024: 23 ng/L      Saw urology for incidental finding of the large staghorn calculus. No symptoms. She has follow-up with them.     Lymphadenopathy was found at [facility] when workup was done for the CSF leak. She has follow-up with them Friday with plans for bx.    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  Lung: Respirations are non-labored.  Extremities: Normal range of motion  Integumentary: Warm and dry    Neurologic:  Mental status : alert, fund of knowledge appropriate for visit    LANGUAGE: Speech fluent, no dysarthria     CN:  II- pupils equal and reactive, visual fields full  III, IV, VI- extraocular movements intact  V- sensation intact bilaterally  VII- face symmetric  VIII- hearing intact, no nystagmus  IX, X- palate elevates symmetrically  XI- sternocleidomastoid 5/5  XII- tongue midline    MOTOR : intact bulk and tone  5/5 strength in all ext    SENSORY : Mild decrease to pp in the tips of digits 1-3 bilaterally.  Intact to temp and vib in BUE and BLE.      REFLEXES:       Right Left   Triceps 2+ 2+   Biceps 3 2+   Brachioradialis 3 2+   Knee jerk 2+ 2+   Ankle jerk 2+ 2+   Walsh absent   Toes down going "     CEREBELLUM: Very mild action tremor with finger to nose. Finger taps and heel to shin intact bilaterally.     GAIT : normal ; able to do tandem gait     Cognition and Speech: Speech clear and coherent.    Psychiatric: Cooperative, Appropriate mood & affect.     Assessment/Plan:   Alicia Leonard is a 74 year right handed old female who presents for stroke follow-up.    Punctate right posterior borderzone infarcts involving parietal and occipital lobes, small subacute right paracentral lobule infarcts   - Etiology: Suspect ESUS.  She has had extensive workup to evaluate for source of embolus.  CT heart did not show thrombus. CT c/ab/p while showing several incidental findings no evidence of malignancy. Her Zio patch is still pending (was still wearing it).  If atrial fibrillation is seen, will start DOAC and refer to cardiology.   - She completed DAPT today. Continue ASA 81 mg for secondary stroke prevention   - LDL at goal. No indication for statin.  - Mild signs of sleep apnea. She has already established with sleep clinic and getting PSG  - Encourage Mediterranean diet, low sodium and sugar consumption  - PT for very mild intermittent left shoulder abduction weakness/discomfort  - Reveiwed stroke warning signs and encouraged her to go to the ED or call 911 if she has them.    Hypertension  - Goal BP is <130/80 with tighter control associated with improved vascular outcomes. BP is still not well controlled. She will follow-up with her PCP for further recommendations.   - Recommend continued home blood pressure monitoring and keeping a BP log for primary care follow up       I spent 71 minutes providing care for this patient, including reviewing imaging, labs, and notes as well as providing counseling and coordination of care for the above issues.      Again, thank you for allowing me to participate in the care of your patient.        Sincerely,        Opal Guadarrama DO

## 2024-12-12 ENCOUNTER — THERAPY VISIT (OUTPATIENT)
Dept: PHYSICAL THERAPY | Facility: CLINIC | Age: 74
End: 2024-12-12
Attending: PSYCHIATRY & NEUROLOGY
Payer: MEDICARE

## 2024-12-12 DIAGNOSIS — I63.411 CEREBROVASCULAR ACCIDENT (CVA) DUE TO EMBOLISM OF RIGHT MIDDLE CEREBRAL ARTERY (H): Primary | ICD-10-CM

## 2024-12-12 NOTE — PROGRESS NOTES
PHYSICAL THERAPY EVALUATION  Type of Visit: Evaluation        Fall Risk Screen:  Fall screen completed by: PT  Have you fallen 2 or more times in the past year?: No  Have you fallen and had an injury in the past year?: No  Is patient a fall risk?: Department fall risk interventions implemented    Subjective   Patient is a 74 year old female who presents following CVA. On evaluation, patient notes that she does not have any functional concerns or noticed any changes in her mobility, however her doctor noticed some reduced strength in her left arm and wanted it to be further evaluation. She has not had any restrictions with her functional mobility due to this, is able to wash her hair, brush her teeth, drive, complete household chores, and carry items without issue. There is no pain or increased sensation changes with this.         Presenting condition or subjective complaint: (Patient-Rptd) shoulder  Date of onset: 12/04/24 (referral date)    Relevant medical history: (Patient-Rptd) Arthritis; Cancer; Dizziness; Hearing problems; High blood pressure; Incontinence; Menopause; Migraines or headaches; Radiation treatment; Stroke   Dates & types of surgery: (Patient-Rptd) ENT EAR SURGERY    Prior diagnostic imaging/testing results: (Patient-Rptd) MRI; CT scan     Prior therapy history for the same diagnosis, illness or injury: (Patient-Rptd) No      Prior Level of Function  Transfers: Independent  Ambulation: Independent  ADL: Independent  IADL: Driving, Finances, Housekeeping, Laundry, Meal preparation, Medication management    Living Environment  Social support: (Patient-Rptd) With family members   Type of home: (Patient-Rptd) House; Multi-level; Basement   Stairs to enter the home: (Patient-Rptd) Yes (Patient-Rptd) 3 Is there a railing: (Patient-Rptd) Yes     Ramp: (Patient-Rptd) No   Stairs inside the home: (Patient-Rptd) Yes (Patient-Rptd) 38 Is there a railing: (Patient-Rptd) Yes     Help at home: (Patient-Rptd)  None  Equipment owned:       Employment: (Patient-Rptd) No    Hobbies/Interests: (Patient-Rptd) cooking  sewing   playing cards    Patient goals for therapy: (Patient-Rptd) nothing    Pain assessment: Pain denied     Objective      Cognitive Status Examination  Orientation: Oriented to person, place and time   Level of Consciousness: Alert  Follows Commands and Answers Questions: 100% of the time  Personal Safety and Judgement: Intact  Memory: Intact    OBSERVATION: Patient ambulating IND without device  INTEGUMENTARY: Intact  POSTURE: WNL  PALPATION: Tender   RANGE OF MOTION:  AROM limited in L shoulder, PROM WFL  STRENGTH:  Reduced in L shoulder musculature at anterior deltoid, middle deltoid, and serratus anterior.      BED MOBILITY: Independent    TRANSFERS: Independent    WHEELCHAIR MOBILITY: NA    GAIT:   Level of Kinney: Independent  Assistive Device(s): None  Gait Deviations: WNL  Gait Distance: within clinic  Stairs: NT    BALANCE:  Patient demonstrates no overt balance deficits functionally. Patient notes she had balance assessments done at the neurologist and does not have further concerns or want for extended balance testing at this time.     SENSATION:  Patient endorses history of carpal tunnel that causes some numbness in L hand, however this is unchanged since her CVA    COORDINATION: WFL to BUE    Assessment & Plan   CLINICAL IMPRESSIONS  Medical Diagnosis: Cerebrovascular accident (CVA) due to embolism of right middle cerebral artery (H) (I63.411)    Treatment Diagnosis: Force production deficit   Impression/Assessment:  Patient is a 74 year old female who presents following CVA. On evaluation, patient displays functional weakness to left shoulder musculature, however she declines any changes in function due to this. Patient was provided with scapular stabilization and deltoid complex exercises to assist in mechanics and functional strength. She declines wanting continued plan of care at this  time. Patient was educated on reasons to return to PT. Verbalizes understanding. Evaluation only at this time.     Clinical Decision Making (Complexity):  Clinical Presentation: Stable/Uncomplicated  Clinical Presentation Rationale: based on medical and personal factors listed in PT evaluation  Clinical Decision Making (Complexity): Low complexity    PLAN OF CARE  Treatment Interventions:  Evaluation only    Long Term Goals     PT Goal 1  Goal Identifier: HEP  Goal Description: Pt will be able to demonstrate HEP activities without need for cues from provider to demonstrate understanding and independence with HEP.  Rationale: to maximize safety and independence with performance of ADLs and functional tasks;to maximize safety and independence within the home;to maximize safety and independence within the community  Target Date: 12/12/24  Date Met: 12/12/24    Recommended Referrals to Other Professionals:  None at this time  Education Assessment:   Learner/Method: Patient;Demonstration;Listening  Education Comments: Pt verbalizes/demos understanding of education.    Risks and benefits of evaluation/treatment have been explained.   Patient/Family/caregiver agrees with Plan of Care.     Evaluation Time:     PT Eval, Low Complexity Minutes (63185): 25    Evaluation Only     Signing Clinician: Farzana Katz, PT, DPT        [facility]                                                                                   OUTPATIENT PHYSICAL THERAPY      PLAN OF TREATMENT FOR OUTPATIENT REHABILITATION   Patient's Last Name, First Name, Alicia Nj    YOB: 1950   Provider's Name   [facility]   Medical Record No.  [id number]     Onset Date: 12/04/24 (referral date)  Start of Care Date: 12/12/24     Medical Diagnosis:  Cerebrovascular accident (CVA) due to embolism of right middle cerebral artery (H) (I63.411)      PT Treatment Diagnosis:  Force  production deficit Plan of Treatment  Frequency/Duration:  / eval only    Certification date from 12/12/24 to 12/12/24         See note for plan of treatment details and functional goals     Farzana Katz, PT, DPT                        I CERTIFY THE NEED FOR THESE SERVICES FURNISHED UNDER        THIS PLAN OF TREATMENT AND WHILE UNDER MY CARE     (Physician attestation of this document indicates review and certification of the therapy plan).              Referring Provider:  Opal Guadarrama    Initial Assessment  See Epic Evaluation- Start of Care Date: 12/12/24

## 2025-01-19 ENCOUNTER — HEALTH MAINTENANCE LETTER (OUTPATIENT)
Age: 75
End: 2025-01-19

## 2025-01-22 ENCOUNTER — PRE VISIT (OUTPATIENT)
Dept: NEUROLOGY | Facility: CLINIC | Age: 75
End: 2025-01-22

## 2025-01-26 NOTE — PROGRESS NOTES
Neurology Consultation    Patient Name:  Alicia Leonard  MRN:  [id number]    :  1950  Date of Service:  2025  Primary care provider:  Torres Raza        Chief Complaint: Follow-up    History of Present Illness:     Alicia Leonard is a 74 year old female who presents for stroke follow-up.  She has an appointment in [place] with her endrocrinologist in 1 hr so abridged appointment.  BP has been running from 150s-180s/80s at home.  PCP increased her verapamil and told her she could decrease how often she checked her BP.  Denies any new stroke like symptoms. Weakness in left arm has improved.     Prior History from 2024:     She reports that she felt like she needed to use the restroom. As she was going to the bathroom, her left arm started to feel funny.  Then felt like her left arm was not doing what her brain wanted it to do and weak. Son-in law is in medicine and said this was likely a stroke.  Symptoms had almost completely resolved by the time she got to the ED.  SBPs in the 230s.       Still has a little pain or weakness with shoulder abduction on the left.  She is otherwise pretty much back to baseline.  Denies cognitive or memory concerns.      Cold sensation in the left hand digits 1st-3rd. Had this prior to stroke. Does not wake her from sleep.  Has history of carpal tunnel syndrome. Had bilateral carpal tunnel release in her 30s.       Has been checking her BP at home. Still running high mostly SBP in 150s-160s.      She completed Plavix today.  She continues ASA 81 mg.      Date of stroke: 2024  Symptoms: Left arm weakness   Location of stroke: Punctate right posterior borderzone infarcts involving parietal and occipital lobes, small subacute right paracentral lobule infarcts   Etiology of stroke: ESUS   CHADSVASC2 if patient has a fib: No atrial fibrillation seen on telemetry, outpatient cardiac monitoring pending   PFO/Watchman candidate? (If <60) NA  CEA/Carotid  stenting candidate?: Mild bilateral carotid artery stenosis - no indication   Aspirin?: DAPT x 21 days, then ASA monotherapy   Hx of aspirin failure?: Not on PTA ASA or statin   Requiring anticoagulation?: No indication at this time, waiting on Zio patch/cardiac monitor   Smoker/smoking cessation? Denies  Cholesterol: LDL 67, no indication for statin   Sleep apnea?: Avila- saw sleep clinic on 11/18 with plan for PSG  Diabetes/control: 5.8%        MRI/Head CT Acute/subacute punctate right posterior borderzone infarcts involving parietal and occipital lobes, small subacute right paracentral lobule infarcts    Intracranial Vasculature No LVO or high grade stenosis    Cervical Vasculature Mild bilateral carotid atherosclerosis      Echocardiogram EF > 70%, no regional WMA, normal atrial sizes    EKG/Telemetry Sinus rhythm   Minimal voltage criteria for LVH, may be normal variant (Sokolow-Gaston)   T wave abnormality, consider inferolateral ischemia    Misc CT C/A/P:   1.  Minimal-to-moderate right renal pelvic dilatation with peripelvic edema. Large staghorn calculus involving the right lower renal pole extending into the mid right renal pelvis measuring 3.2 cm in maximal dimension. Mild proximal right ureteral dilatation urothelial wall thickening and periureteral edema. Recommend correlation with UA for underlying infection or hematuria. Consider follow-up Urology consultation.  2.  Bilateral pulmonary nodules with the largest having a mean diameter of 5 mm.  3.  Bilateral axillary lymphadenopathy with mild bilateral hilar lymphadenopathy. Findings can be seen with lymphoma/ lymphoproliferative disorders or other etiologies including sarcoidosis or infection. Consider further evaluation with tissue diagnosis, Hematology/Oncology consultation, and/or PET/CT.  4.  Uterine fibroid.     CT heart structure:   1. Normal pulmonary venous anatomy with all pulmonary veins draining  into the left atrium.  2  No thrombus is  detected in the left atrial appendage.   3.  There is no left ventricular mass or thrombus.   4.   The visualized aortic root, ascending aorta and descending thoracic aorta are normal.  6. Concentric left ventricular hypertrophy is noted.. Coronary calcification in left anterior descending is seen. There is no pericardial effusion      LDL  11/13/2024: 67 mg/dL   A1C  11/13/2024: 5.8 %   Troponin 11/14/2024: 23 ng/L      Saw urology for incidental finding of the large staghorn calculus. No symptoms. She has follow-up with them.      Lymphadenopathy was found at [place] when workup was done for the CSF leak. She has follow-up with them Friday with plans for bx.      Physical Exam:  BP (!) 181/76   Pulse 86   Wt 78.9 kg (174 lb)   SpO2 100%   BMI 28.08 kg/m      General:  No acute distress  Cardiovascular: Normal rate.  Lung: Respirations are non-labored.  Extremities: Normal range of motion  Integumentary: Warm and dry     Neurologic:  Mental status : alert, fund of knowledge appropriate for visit     LANGUAGE: Speech fluent, no dysarthria      CN:  II- pupils equal and reactive, visual fields full  III, IV, VI- extraocular movements intact  V- sensation intact bilaterally  VII- face symmetric  VIII- hearing intact, no nystagmus  IX, X- palate elevates symmetrically  XI- sternocleidomastoid 5/5  XII- tongue midline     MOTOR : intact bulk   5/5 strength in all ext     SENSORY : Intact to light touch in BUE and BLE      REFLEXES: Walsh absent      CEREBELLUM: Very mild action tremor with finger to nose. Finger taps and heel to shin intact bilaterally.      GAIT : normal      Cognition and Speech: Speech clear and coherent.     Psychiatric: Cooperative, Appropriate mood & affect.     Assessment/Plan:   Alicia Leonard is a 74 year right handed old female who presents for stroke follow-up.     Punctate right posterior borderzone infarcts involving parietal and occipital lobes, small subacute right paracentral lobule  infarcts   - Etiology: Suspect ESUS.  She has had extensive workup to evaluate for source of embolus.  CT heart did not show thrombus. CT c/ab/p while showing several incidental findings no evidence of malignancy. Her Zio patch results are still pending. If atrial fibrillation is seen, will start DOAC and refer to cardiology.   - She completed DAPT x 21 days. Continue ASA 81 mg for secondary stroke prevention   - LDL at goal. No indication for statin.  - Mild signs of sleep apnea. She has already established with sleep clinic and getting PSG  - Encourage Mediterranean diet, low sodium and sugar consumption  - PT for very mild intermittent left shoulder abduction weakness/discomfort  - Reveiwed stroke warning signs and encouraged her to go to the ED or call 911 if she has them.     Hypertension  - Goal BP is <130/80 with tighter control associated with improved vascular outcomes. BP is still not well controlled. She will follow-up with her PCP for further recommendations.   - Recommend continued home blood pressure monitoring and keeping a BP log for primary care follow up        I spent 25 minutes providing care for this patient, including reviewing imaging, labs, and notes as well as providing counseling and coordination of care for the above issues.

## 2025-01-27 ENCOUNTER — OFFICE VISIT (OUTPATIENT)
Dept: NEUROLOGY | Facility: CLINIC | Age: 75
End: 2025-01-27
Payer: MEDICARE

## 2025-01-27 VITALS
BODY MASS INDEX: 28.08 KG/M2 | DIASTOLIC BLOOD PRESSURE: 76 MMHG | WEIGHT: 174 LBS | HEART RATE: 86 BPM | OXYGEN SATURATION: 100 % | SYSTOLIC BLOOD PRESSURE: 181 MMHG

## 2025-01-27 DIAGNOSIS — I63.411 CEREBROVASCULAR ACCIDENT (CVA) DUE TO EMBOLISM OF RIGHT MIDDLE CEREBRAL ARTERY (H): Primary | ICD-10-CM

## 2025-01-27 PROCEDURE — 99213 OFFICE O/P EST LOW 20 MIN: CPT | Performed by: PSYCHIATRY & NEUROLOGY

## 2025-01-27 NOTE — LETTER
2025      Alicia Leonard  [address]  [place] [address]      Dear Colleague,    Thank you for referring your patient, Alicia Leonard, to the [facility]. Please see a copy of my visit note below.      Neurology Consultation    Patient Name:  Alicia Leonard  MRN:  [id number]    :  1950  Date of Service:  2025  Primary care provider:  Torres Raza        Chief Complaint: Follow-up    History of Present Illness:     Alicia Leonard is a 74 year old female who presents for stroke follow-up.  She has an appointment in [place] with her endrocrinologist in 1 hr so abridged appointment.  BP has been running from 150s-180s/80s at home.  PCP increased her verapamil and told her she could decrease how often she checked her BP.  Denies any new stroke like symptoms. Weakness in left arm has improved.     Prior History from 2024:     She reports that she felt like she needed to use the restroom. As she was going to the bathroom, her left arm started to feel funny.  Then felt like her left arm was not doing what her brain wanted it to do and weak. Son-in law is in medicine and said this was likely a stroke.  Symptoms had almost completely resolved by the time she got to the ED.  SBPs in the 230s.       Still has a little pain or weakness with shoulder abduction on the left.  She is otherwise pretty much back to baseline.  Denies cognitive or memory concerns.      Cold sensation in the left hand digits 1st-3rd. Had this prior to stroke. Does not wake her from sleep.  Has history of carpal tunnel syndrome. Had bilateral carpal tunnel release in her 30s.       Has been checking her BP at home. Still running high mostly SBP in 150s-160s.      She completed Plavix today.  She continues ASA 81 mg.      Date of stroke: 2024  Symptoms: Left arm weakness   Location of stroke: Punctate right posterior borderzone infarcts involving parietal and occipital  lobes, small subacute right paracentral lobule infarcts   Etiology of stroke: ESUS   CHADSVASC2 if patient has a fib: No atrial fibrillation seen on telemetry, outpatient cardiac monitoring pending   PFO/Watchman candidate? (If <60) NA  CEA/Carotid stenting candidate?: Mild bilateral carotid artery stenosis - no indication   Aspirin?: DAPT x 21 days, then ASA monotherapy   Hx of aspirin failure?: Not on PTA ASA or statin   Requiring anticoagulation?: No indication at this time, waiting on Zio patch/cardiac monitor   Smoker/smoking cessation? Denies  Cholesterol: LDL 67, no indication for statin   Sleep apnea?: Avila- saw sleep clinic on 11/18 with plan for PSG  Diabetes/control: 5.8%        MRI/Head CT Acute/subacute punctate right posterior borderzone infarcts involving parietal and occipital lobes, small subacute right paracentral lobule infarcts    Intracranial Vasculature No LVO or high grade stenosis    Cervical Vasculature Mild bilateral carotid atherosclerosis      Echocardiogram EF > 70%, no regional WMA, normal atrial sizes    EKG/Telemetry Sinus rhythm   Minimal voltage criteria for LVH, may be normal variant (Sokolow-Gaston)   T wave abnormality, consider inferolateral ischemia    Misc CT C/A/P:   1.  Minimal-to-moderate right renal pelvic dilatation with peripelvic edema. Large staghorn calculus involving the right lower renal pole extending into the mid right renal pelvis measuring 3.2 cm in maximal dimension. Mild proximal right ureteral dilatation urothelial wall thickening and periureteral edema. Recommend correlation with UA for underlying infection or hematuria. Consider follow-up Urology consultation.  2.  Bilateral pulmonary nodules with the largest having a mean diameter of 5 mm.  3.  Bilateral axillary lymphadenopathy with mild bilateral hilar lymphadenopathy. Findings can be seen with lymphoma/ lymphoproliferative disorders or other etiologies including sarcoidosis or infection. Consider  further evaluation with tissue diagnosis, Hematology/Oncology consultation, and/or PET/CT.  4.  Uterine fibroid.     CT heart structure:   1. Normal pulmonary venous anatomy with all pulmonary veins draining  into the left atrium.  2  No thrombus is detected in the left atrial appendage.   3.  There is no left ventricular mass or thrombus.   4.   The visualized aortic root, ascending aorta and descending thoracic aorta are normal.  6. Concentric left ventricular hypertrophy is noted.. Coronary calcification in left anterior descending is seen. There is no pericardial effusion      LDL  11/13/2024: 67 mg/dL   A1C  11/13/2024: 5.8 %   Troponin 11/14/2024: 23 ng/L      Saw urology for incidental finding of the large staghorn calculus. No symptoms. She has follow-up with them.      Lymphadenopathy was found at [place] when workup was done for the CSF leak. She has follow-up with them Friday with plans for bx.      Physical Exam:  BP (!) 181/76   Pulse 86   Wt 78.9 kg (174 lb)   SpO2 100%   BMI 28.08 kg/m      General:  No acute distress  Cardiovascular: Normal rate.  Lung: Respirations are non-labored.  Extremities: Normal range of motion  Integumentary: Warm and dry     Neurologic:  Mental status : alert, fund of knowledge appropriate for visit     LANGUAGE: Speech fluent, no dysarthria      CN:  II- pupils equal and reactive, visual fields full  III, IV, VI- extraocular movements intact  V- sensation intact bilaterally  VII- face symmetric  VIII- hearing intact, no nystagmus  IX, X- palate elevates symmetrically  XI- sternocleidomastoid 5/5  XII- tongue midline     MOTOR : intact bulk   5/5 strength in all ext     SENSORY : Intact to light touch in BUE and BLE      REFLEXES: Walsh absent      CEREBELLUM: Very mild action tremor with finger to nose. Finger taps and heel to shin intact bilaterally.      GAIT : normal      Cognition and Speech: Speech clear and coherent.     Psychiatric: Cooperative, Appropriate mood  & affect.     Assessment/Plan:   Alicia Leonard is a 74 year right handed old female who presents for stroke follow-up.     Punctate right posterior borderzone infarcts involving parietal and occipital lobes, small subacute right paracentral lobule infarcts   - Etiology: Suspect ESUS.  She has had extensive workup to evaluate for source of embolus.  CT heart did not show thrombus. CT c/ab/p while showing several incidental findings no evidence of malignancy. Her Zio patch results are still pending. If atrial fibrillation is seen, will start DOAC and refer to cardiology.   - She completed DAPT x 21 days. Continue ASA 81 mg for secondary stroke prevention   - LDL at goal. No indication for statin.  - Mild signs of sleep apnea. She has already established with sleep clinic and getting PSG  - Encourage Mediterranean diet, low sodium and sugar consumption  - PT for very mild intermittent left shoulder abduction weakness/discomfort  - Reveiwed stroke warning signs and encouraged her to go to the ED or call 911 if she has them.     Hypertension  - Goal BP is <130/80 with tighter control associated with improved vascular outcomes. BP is still not well controlled. She will follow-up with her PCP for further recommendations.   - Recommend continued home blood pressure monitoring and keeping a BP log for primary care follow up        I spent 25 minutes providing care for this patient, including reviewing imaging, labs, and notes as well as providing counseling and coordination of care for the above issues.        Again, thank you for allowing me to participate in the care of your patient.        Sincerely,        Opal Guadarrama DO    Electronically signed

## 2025-05-01 ENCOUNTER — OFFICE VISIT (OUTPATIENT)
Dept: NEUROLOGY | Facility: CLINIC | Age: 75
End: 2025-05-01
Payer: MEDICARE

## 2025-05-01 VITALS
HEIGHT: 67 IN | SYSTOLIC BLOOD PRESSURE: 120 MMHG | DIASTOLIC BLOOD PRESSURE: 70 MMHG | HEART RATE: 84 BPM | OXYGEN SATURATION: 99 % | WEIGHT: 172.1 LBS | BODY MASS INDEX: 27.01 KG/M2

## 2025-05-01 DIAGNOSIS — R29.898 WEAKNESS OF LEFT SHOULDER: Primary | ICD-10-CM

## 2025-05-01 DIAGNOSIS — I63.10 CEREBROVASCULAR ACCIDENT (CVA) DUE TO EMBOLISM OF PRECEREBRAL ARTERY (H): ICD-10-CM

## 2025-05-01 RX ORDER — AMLODIPINE BESYLATE 10 MG/1
10 TABLET ORAL DAILY
COMMUNITY
Start: 2025-02-27

## 2025-05-01 NOTE — LETTER
2025      Alicia Leonard  [address]  [place] [address]      Dear Colleague,    Thank you for referring your patient, Alicia Leonard, to the [facility]UROLOG[facility]. Please see a copy of my visit note below.      Neurology Consultation    Patient Name:  Alicia Leonard  MRN:  [id number]    :  1950  Date of Service:  2025  Primary care provider:  Torres Raza        Chief Complaint: Follow-up stroke     History of Present Illness:     Alicia Leonard is a 74 year old female who presents for follow-up concerning stroke. She is doing well. Reviewed her blood pressure log and this looks much better.  Verapamil was switched to amlodipine. Continues to notice some left shoulder weakness, only when she abducts her arm a certain way.  Starting to get a little pain and decreased ROM in her shoulder as well. Had kidney stone removed at [place].  Taking ASA.      Prior History stroke workup/history:     Date of stroke: 2024  Symptoms: Left arm weakness   Location of stroke: Punctate right posterior borderzone infarcts involving parietal and occipital lobes, small subacute right paracentral lobule infarcts   Etiology of stroke: ESUS   CHADSVASC2 if patient has a fib: No atrial fibrillation seen on telemetry, outpatient cardiac monitoring pending   PFO/Watchman candidate? (If <60) NA  CEA/Carotid stenting candidate?: Mild bilateral carotid artery stenosis - no indication   Aspirin?: DAPT x 21 days, then ASA monotherapy   Hx of aspirin failure?: Not on PTA ASA or statin   Requiring anticoagulation?: No indication at this time, waiting on Zio patch/cardiac monitor   Smoker/smoking cessation? Denies  Cholesterol: LDL 67, no indication for statin   Sleep apnea?: Avila- saw sleep clinic on  with plan for PSG  Diabetes/control: 5.8%        MRI/Head CT Acute/subacute punctate right posterior borderzone infarcts involving parietal and occipital lobes, small subacute  "right paracentral lobule infarcts    Intracranial Vasculature No LVO or high grade stenosis    Cervical Vasculature Mild bilateral carotid atherosclerosis      Echocardiogram EF > 70%, no regional WMA, normal atrial sizes    EKG/Telemetry Sinus rhythm   Minimal voltage criteria for LVH, may be normal variant (Sokolow-Gaston)   T wave abnormality, consider inferolateral ischemia    Misc CT C/A/P:   1.  Minimal-to-moderate right renal pelvic dilatation with peripelvic edema. Large staghorn calculus involving the right lower renal pole extending into the mid right renal pelvis measuring 3.2 cm in maximal dimension. Mild proximal right ureteral dilatation urothelial wall thickening and periureteral edema. Recommend correlation with UA for underlying infection or hematuria. Consider follow-up Urology consultation.  2.  Bilateral pulmonary nodules with the largest having a mean diameter of 5 mm.  3.  Bilateral axillary lymphadenopathy with mild bilateral hilar lymphadenopathy. Findings can be seen with lymphoma/ lymphoproliferative disorders or other etiologies including sarcoidosis or infection. Consider further evaluation with tissue diagnosis, Hematology/Oncology consultation, and/or PET/CT.  4.  Uterine fibroid.     CT heart structure:   1. Normal pulmonary venous anatomy with all pulmonary veins draining  into the left atrium.  2  No thrombus is detected in the left atrial appendage.   3.  There is no left ventricular mass or thrombus.   4.   The visualized aortic root, ascending aorta and descending thoracic aorta are normal.  6. Concentric left ventricular hypertrophy is noted.. Coronary calcification in left anterior descending is seen. There is no pericardial effusion      LDL  11/13/2024: 67 mg/dL   A1C  11/13/2024: 5.8 %   Troponin 11/14/2024: 23 ng/L       Physical Exam:  /70   Pulse 84   Ht 1.69 m (5' 6.54\")   Wt 78.1 kg (172 lb 1.6 oz)   SpO2 99%   BMI 27.33 kg/m      General:  No acute " distress  Cardiovascular: Normal rate.  Lung: Respirations are non-labored.  Integumentary: Warm and dry     Neurologic:  Mental status : alert, fund of knowledge appropriate for visit     LANGUAGE: Speech fluent, no dysarthria      CN:  II- pupils equal and reactive, visual fields full  III, IV, VI- extraocular movements intact  V- sensation intact bilaterally  VII- face symmetric  VIII- hearing intact, no nystagmus  IX, X- palate elevates symmetrically  XI- sternocleidomastoid 5/5  XII- tongue midline     MOTOR : intact bulk, tone appropriate in BUE  5/5 strength in all ext except 4+ with shoulder abduction, some mild decreased ROM with shoulder abduction      SENSORY : Intact to light touch in BUE and BLE      REFLEXES: Walsh absent      CEREBELLUM: Very mild action tremor with finger to nose. Finger taps and heel to shin intact bilaterally.      GAIT : normal      Cognition and Speech: Speech clear and coherent.     Psychiatric: Cooperative, Appropriate mood & affect.    Assessment/Plan:   Alicia Leonard is a 74 year right handed old female who presents for stroke follow-up.     Punctate right posterior borderzone infarcts involving parietal and occipital lobes, small subacute right paracentral lobule infarcts   - Etiology: Suspect ESUS.  She has had extensive workup to evaluate for source of embolus.  CT heart did not show thrombus. CT c/ab/p while showing several incidental findings no evidence of malignancy. Zio patch 14 day monitor results (not in the system but were sent to the patient?) shows no atrial fibrillation.   - She completed DAPT x 21 days. Continue ASA 81 mg for secondary stroke prevention indefinitely   - LDL at goal. No indication for statin.  - Mild signs of sleep apnea. She has already established with sleep clinic and getting PSG  - Encourage Mediterranean diet, low sodium and sugar consumption  - PT for very mild intermittent left shoulder abduction weakness/discomfort  - Reveiwed stroke  warning signs and encouraged her to go to the ED or call 911 if she has them.     Hypertension  - Goal BP is <130/80 with tighter control associated with improved vascular outcomes. BP is much better controlled and at goal today. She can continue blood pressure log but does not need to check so frequently.      Follow-up in 1 year, sooner if needed      I spent 31 minutes providing care for this patient, including reviewing imaging, labs, and notes as well as providing counseling and coordination of care for the above issues.      Again, thank you for allowing me to participate in the care of your patient.        Sincerely,        Opal Guadarrama, DO    Electronically signed

## 2025-05-01 NOTE — PROGRESS NOTES
Neurology Consultation    Patient Name:  Alicia Leonard  MRN:  [id number]    :  1950  Date of Service:  2025  Primary care provider:  Torres Raza        Chief Complaint: Follow-up stroke     History of Present Illness:     Alicia Leonard is a 74 year old female who presents for follow-up concerning stroke. She is doing well. Reviewed her blood pressure log and this looks much better.  Verapamil was switched to amlodipine. Continues to notice some left shoulder weakness, only when she abducts her arm a certain way.  Starting to get a little pain and decreased ROM in her shoulder as well. Had kidney stone removed at [facility].  Taking ASA.      Prior History stroke workup/history:     Date of stroke: 2024  Symptoms: Left arm weakness   Location of stroke: Punctate right posterior borderzone infarcts involving parietal and occipital lobes, small subacute right paracentral lobule infarcts   Etiology of stroke: ESUS   CHADSVASC2 if patient has a fib: No atrial fibrillation seen on telemetry, outpatient cardiac monitoring pending   PFO/Watchman candidate? (If <60) NA  CEA/Carotid stenting candidate?: Mild bilateral carotid artery stenosis - no indication   Aspirin?: DAPT x 21 days, then ASA monotherapy   Hx of aspirin failure?: Not on PTA ASA or statin   Requiring anticoagulation?: No indication at this time, waiting on Zio patch/cardiac monitor   Smoker/smoking cessation? Denies  Cholesterol: LDL 67, no indication for statin   Sleep apnea?: wade- saw sleep clinic on  with plan for PSG  Diabetes/control: 5.8%        MRI/Head CT Acute/subacute punctate right posterior borderzone infarcts involving parietal and occipital lobes, small subacute right paracentral lobule infarcts    Intracranial Vasculature No LVO or high grade stenosis    Cervical Vasculature Mild bilateral carotid atherosclerosis      Echocardiogram EF > 70%, no regional WMA, normal atrial sizes    EKG/Telemetry Sinus rhythm  "  Minimal voltage criteria for LVH, may be normal variant (Sokolow-Gaston)   T wave abnormality, consider inferolateral ischemia    Misc CT C/A/P:   1.  Minimal-to-moderate right renal pelvic dilatation with peripelvic edema. Large staghorn calculus involving the right lower renal pole extending into the mid right renal pelvis measuring 3.2 cm in maximal dimension. Mild proximal right ureteral dilatation urothelial wall thickening and periureteral edema. Recommend correlation with UA for underlying infection or hematuria. Consider follow-up Urology consultation.  2.  Bilateral pulmonary nodules with the largest having a mean diameter of 5 mm.  3.  Bilateral axillary lymphadenopathy with mild bilateral hilar lymphadenopathy. Findings can be seen with lymphoma/ lymphoproliferative disorders or other etiologies including sarcoidosis or infection. Consider further evaluation with tissue diagnosis, Hematology/Oncology consultation, and/or PET/CT.  4.  Uterine fibroid.     CT heart structure:   1. Normal pulmonary venous anatomy with all pulmonary veins draining  into the left atrium.  2  No thrombus is detected in the left atrial appendage.   3.  There is no left ventricular mass or thrombus.   4.   The visualized aortic root, ascending aorta and descending thoracic aorta are normal.  6. Concentric left ventricular hypertrophy is noted.. Coronary calcification in left anterior descending is seen. There is no pericardial effusion      LDL  11/13/2024: 67 mg/dL   A1C  11/13/2024: 5.8 %   Troponin 11/14/2024: 23 ng/L       Physical Exam:  /70   Pulse 84   Ht 1.69 m (5' 6.54\")   Wt 78.1 kg (172 lb 1.6 oz)   SpO2 99%   BMI 27.33 kg/m      General:  No acute distress  Cardiovascular: Normal rate.  Lung: Respirations are non-labored.  Integumentary: Warm and dry     Neurologic:  Mental status : alert, fund of knowledge appropriate for visit     LANGUAGE: Speech fluent, no dysarthria      CN:  II- pupils equal and " reactive, visual fields full  III, IV, VI- extraocular movements intact  V- sensation intact bilaterally  VII- face symmetric  VIII- hearing intact, no nystagmus  IX, X- palate elevates symmetrically  XI- sternocleidomastoid 5/5  XII- tongue midline     MOTOR : intact bulk, tone appropriate in BUE  5/5 strength in all ext except 4+ with shoulder abduction, some mild decreased ROM with shoulder abduction      SENSORY : Intact to light touch in BUE and BLE      REFLEXES: Walsh absent      CEREBELLUM: Very mild action tremor with finger to nose. Finger taps and heel to shin intact bilaterally.      GAIT : normal      Cognition and Speech: Speech clear and coherent.     Psychiatric: Cooperative, Appropriate mood & affect.    Assessment/Plan:   Alicia Leonard is a 74 year right handed old female who presents for stroke follow-up.     Punctate right posterior borderzone infarcts involving parietal and occipital lobes, small subacute right paracentral lobule infarcts   - Etiology: Suspect ESUS.  She has had extensive workup to evaluate for source of embolus.  CT heart did not show thrombus. CT c/ab/p while showing several incidental findings no evidence of malignancy. Zio patch 14 day monitor results (not in the system but were sent to the patient?) shows no atrial fibrillation.   - She completed DAPT x 21 days. Continue ASA 81 mg for secondary stroke prevention indefinitely   - LDL at goal. No indication for statin.  - Mild signs of sleep apnea. She has already established with sleep clinic and getting PSG  - Encourage Mediterranean diet, low sodium and sugar consumption  - PT for very mild intermittent left shoulder abduction weakness/discomfort  - Reveiwed stroke warning signs and encouraged her to go to the ED or call 911 if she has them.     Hypertension  - Goal BP is <130/80 with tighter control associated with improved vascular outcomes. BP is much better controlled and at goal today. She can continue blood  pressure log but does not need to check so frequently.      Follow-up in 1 year, sooner if needed      I spent 31 minutes providing care for this patient, including reviewing imaging, labs, and notes as well as providing counseling and coordination of care for the above issues.

## 2025-05-11 NOTE — PROGRESS NOTES
HPI:     Patient was last seen on 5/6/24 by me for vision evaluation in the presence of pituitary adenoma.  At that time, there was no evidence of compressive optic neuropathy.  Her visual fields were unreliable.     Since then, she suffered a CVA on 11/13/24.  She denies any related vision changes.  Overall, her vision is stable.  Her distance vision is unchanged, and she still reads comfortably with OTC readers as needed.  Denies peripheral vision changes.     Review of outside imaging:  MRI Brain [facility] 11/14/24:  IMPRESSION:  1.  There are a few punctate acute/subacute right posterior border zone infarcts involving the parietal and occipital lobes.  2.  Small subacute infarcts in the right paracentral lobule. This would account for the patient's reported left arm weakness.  3.  No mass effect or hemorrhage.    Review of outside note:  Visit with endocrinology 1/27/25:  1. Pituitary microadenoma: 0.9 x 0.7 sellar mass, initially discovered in 2020. Biochemical workup was normal except for salivary cortisols; 1 mg DST was normal. Will re-assess by checking relevant labs as below. Will also repeat MRI.     Plan:    1. Labs: ACTH, cortisol, IGF-1, prolactin, TSH, FT4, and LNSC  2. Pituitary MRI  3. Follow-up in 1 year (unless results suggest otherwise)     Today's Testing:  OCT RNFL:  Right eye: Average RNFL 111 microns (previously 110).  Stable, normal compared to age-matched controls.  Left eye: Average RNFL 122 microns (previously 120).  Stable, normal compared to age-matched controls.  GCL 1.12/1.17 (stable)     GTop: Questionable left superior quadrantanopia.  Right eye: Unreliable, SN defect, non-specific points of depression (distinct from previous).  Left eye: Unreliable, ST defect, improved from previous.     Assessment and Plan:  It is my impression that Alicia has a stable exam without evidence of compressive optic neuropathy in the setting of pituitary adenoma discovered in 2020 without previous visual  complication.  Her retinal nerve fiber layer and ganglion cell layer are intact.  Alicia's visual field today was unreliable.  She should follow-up with me in 1 year or sooner with any concerns/changes.     Complete documentation of historical and exam elements from today's encounter can be found in the full encounter summary report (not reduplicated in this progress note). I personally obtained the chief complaint(s) and history of present illness. I confirmed and edited as necessary the review of systems, past medical/surgical history, family history, social history, and examination findings as documented by others; and I examined the patient myself. I personally reviewed the relevant tests, images, and reports as documented above. I formulated and edited as necessary the assessment and plan and discussed the findings and management plan with the patient and family.    Isabel Andrews OD

## 2025-05-12 ENCOUNTER — OFFICE VISIT (OUTPATIENT)
Dept: OPHTHALMOLOGY | Facility: CLINIC | Age: 75
End: 2025-05-12
Payer: MEDICARE

## 2025-05-12 DIAGNOSIS — D35.2 PITUITARY ADENOMA (H): Primary | ICD-10-CM

## 2025-05-12 PROCEDURE — 92133 CPTRZD OPH DX IMG PST SGM ON: CPT

## 2025-05-12 PROCEDURE — 92083 EXTENDED VISUAL FIELD XM: CPT

## 2025-05-12 ASSESSMENT — SLIT LAMP EXAM - LIDS
COMMENTS: NORMAL
COMMENTS: NORMAL

## 2025-05-12 ASSESSMENT — CONF VISUAL FIELD
OS_NORMAL: 1
OS_SUPERIOR_TEMPORAL_RESTRICTION: 0
OD_NORMAL: 1
METHOD: COUNTING FINGERS
OD_INFERIOR_TEMPORAL_RESTRICTION: 0
OS_SUPERIOR_NASAL_RESTRICTION: 0
OD_SUPERIOR_TEMPORAL_RESTRICTION: 0
OD_INFERIOR_NASAL_RESTRICTION: 0
OS_INFERIOR_NASAL_RESTRICTION: 0
OD_SUPERIOR_NASAL_RESTRICTION: 0
OS_INFERIOR_TEMPORAL_RESTRICTION: 0

## 2025-05-12 ASSESSMENT — VISUAL ACUITY
OS_SC: 20/25
OD_SC: 20/30
OS_SC+: -2
METHOD: SNELLEN - LINEAR

## 2025-05-12 ASSESSMENT — REFRACTION_WEARINGRX
OS_SPHERE: +2.00
OD_SPHERE: +2.00
SPECS_TYPE: OTC READERS
OD_CYLINDER: SPHERE
OS_CYLINDER: SPHERE

## 2025-05-12 ASSESSMENT — EXTERNAL EXAM - LEFT EYE: OS_EXAM: NORMAL

## 2025-05-12 ASSESSMENT — CUP TO DISC RATIO
OS_RATIO: 0.35
OD_RATIO: 0.35

## 2025-05-12 ASSESSMENT — TONOMETRY
OD_IOP_MMHG: 13
IOP_METHOD: TONOPEN
OS_IOP_MMHG: 13

## 2025-05-12 ASSESSMENT — EXTERNAL EXAM - RIGHT EYE: OD_EXAM: NORMAL
